# Patient Record
Sex: FEMALE | Race: WHITE | NOT HISPANIC OR LATINO | URBAN - METROPOLITAN AREA
[De-identification: names, ages, dates, MRNs, and addresses within clinical notes are randomized per-mention and may not be internally consistent; named-entity substitution may affect disease eponyms.]

---

## 2018-01-08 ENCOUNTER — EMERGENCY (EMERGENCY)
Facility: HOSPITAL | Age: 83
LOS: 1 days | Discharge: HOME | End: 2018-01-08
Attending: EMERGENCY MEDICINE | Admitting: EMERGENCY MEDICINE
Payer: MEDICARE

## 2018-01-08 VITALS
WEIGHT: 111.99 LBS | RESPIRATION RATE: 18 BRPM | HEART RATE: 90 BPM | DIASTOLIC BLOOD PRESSURE: 90 MMHG | TEMPERATURE: 98 F | HEIGHT: 60 IN | OXYGEN SATURATION: 95 % | SYSTOLIC BLOOD PRESSURE: 184 MMHG

## 2018-01-08 VITALS
DIASTOLIC BLOOD PRESSURE: 95 MMHG | HEART RATE: 85 BPM | OXYGEN SATURATION: 91 % | TEMPERATURE: 98 F | RESPIRATION RATE: 18 BRPM | SYSTOLIC BLOOD PRESSURE: 159 MMHG

## 2018-01-08 DIAGNOSIS — S27.329A CONTUSION OF LUNG, UNSPECIFIED, INITIAL ENCOUNTER: ICD-10-CM

## 2018-01-08 DIAGNOSIS — Z98.89 OTHER SPECIFIED POSTPROCEDURAL STATES: Chronic | ICD-10-CM

## 2018-01-08 DIAGNOSIS — S22.39XA FRACTURE OF ONE RIB, UNSPECIFIED SIDE, INITIAL ENCOUNTER FOR CLOSED FRACTURE: ICD-10-CM

## 2018-01-08 DIAGNOSIS — Z79.891 LONG TERM (CURRENT) USE OF OPIATE ANALGESIC: ICD-10-CM
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
## 2018-01-08 DIAGNOSIS — Z79.899 OTHER LONG TERM (CURRENT) DRUG THERAPY: ICD-10-CM

## 2018-01-08 DIAGNOSIS — M25.561 PAIN IN RIGHT KNEE: ICD-10-CM

## 2018-01-08 DIAGNOSIS — S22.41XA MULTIPLE FRACTURES OF RIBS, RIGHT SIDE, INITIAL ENCOUNTER FOR CLOSED FRACTURE: ICD-10-CM

## 2018-01-08 DIAGNOSIS — I10 ESSENTIAL (PRIMARY) HYPERTENSION: ICD-10-CM
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
## 2018-01-08 LAB
ALBUMIN SERPL ELPH-MCNC: 3.7 G/DL — SIGNIFICANT CHANGE UP (ref 3.3–5)
ALP SERPL-CCNC: 75 U/L — SIGNIFICANT CHANGE UP (ref 40–120)
ALT FLD-CCNC: 13 U/L — SIGNIFICANT CHANGE UP (ref 10–45)
ANION GAP SERPL CALC-SCNC: 13 MMOL/L — SIGNIFICANT CHANGE UP (ref 5–17)
AST SERPL-CCNC: 24 U/L — SIGNIFICANT CHANGE UP (ref 10–40)
BASOPHILS NFR BLD AUTO: 0.2 % — SIGNIFICANT CHANGE UP (ref 0–2)
BILIRUB SERPL-MCNC: 0.8 MG/DL — SIGNIFICANT CHANGE UP (ref 0.2–1.2)
BUN SERPL-MCNC: 18 MG/DL — SIGNIFICANT CHANGE UP (ref 7–23)
CALCIUM SERPL-MCNC: 9 MG/DL — SIGNIFICANT CHANGE UP (ref 8.4–10.5)
CHLORIDE SERPL-SCNC: 94 MMOL/L — LOW (ref 96–108)
CO2 SERPL-SCNC: 26 MMOL/L — SIGNIFICANT CHANGE UP (ref 22–31)
CREAT SERPL-MCNC: 0.52 MG/DL — SIGNIFICANT CHANGE UP (ref 0.5–1.3)
EOSINOPHIL NFR BLD AUTO: 0.1 % — SIGNIFICANT CHANGE UP (ref 0–6)
GLUCOSE SERPL-MCNC: 125 MG/DL — HIGH (ref 70–99)
HCT VFR BLD CALC: 38.3 % — SIGNIFICANT CHANGE UP (ref 34.5–45)
HGB BLD-MCNC: 12.8 G/DL — SIGNIFICANT CHANGE UP (ref 11.5–15.5)
LYMPHOCYTES # BLD AUTO: 9.1 % — LOW (ref 13–44)
MCHC RBC-ENTMCNC: 30.6 PG — SIGNIFICANT CHANGE UP (ref 27–34)
MCHC RBC-ENTMCNC: 33.4 G/DL — SIGNIFICANT CHANGE UP (ref 32–36)
MCV RBC AUTO: 91.6 FL — SIGNIFICANT CHANGE UP (ref 80–100)
MONOCYTES NFR BLD AUTO: 7.5 % — SIGNIFICANT CHANGE UP (ref 2–14)
NEUTROPHILS NFR BLD AUTO: 83.1 % — HIGH (ref 43–77)
PLATELET # BLD AUTO: 362 K/UL — SIGNIFICANT CHANGE UP (ref 150–400)
POTASSIUM SERPL-MCNC: 3.7 MMOL/L — SIGNIFICANT CHANGE UP (ref 3.5–5.3)
POTASSIUM SERPL-SCNC: 3.7 MMOL/L — SIGNIFICANT CHANGE UP (ref 3.5–5.3)
PROT SERPL-MCNC: 7.8 G/DL — SIGNIFICANT CHANGE UP (ref 6–8.3)
RBC # BLD: 4.18 M/UL — SIGNIFICANT CHANGE UP (ref 3.8–5.2)
RBC # FLD: 13.9 % — SIGNIFICANT CHANGE UP (ref 10.3–16.9)
SODIUM SERPL-SCNC: 133 MMOL/L — LOW (ref 135–145)
WBC # BLD: 12.7 K/UL — HIGH (ref 3.8–10.5)
WBC # FLD AUTO: 12.7 K/UL — HIGH (ref 3.8–10.5)

## 2018-01-08 PROCEDURE — 99205 OFFICE O/P NEW HI 60 MIN: CPT

## 2018-01-08 PROCEDURE — 73562 X-RAY EXAM OF KNEE 3: CPT

## 2018-01-08 PROCEDURE — 80053 COMPREHEN METABOLIC PANEL: CPT

## 2018-01-08 PROCEDURE — 99285 EMERGENCY DEPT VISIT HI MDM: CPT

## 2018-01-08 PROCEDURE — 73521 X-RAY EXAM HIPS BI 2 VIEWS: CPT | Mod: 26

## 2018-01-08 PROCEDURE — 36415 COLL VENOUS BLD VENIPUNCTURE: CPT

## 2018-01-08 PROCEDURE — 73521 X-RAY EXAM HIPS BI 2 VIEWS: CPT

## 2018-01-08 PROCEDURE — 71100 X-RAY EXAM RIBS UNI 2 VIEWS: CPT

## 2018-01-08 PROCEDURE — 73562 X-RAY EXAM OF KNEE 3: CPT | Mod: 26,RT

## 2018-01-08 PROCEDURE — 85025 COMPLETE CBC W/AUTO DIFF WBC: CPT

## 2018-01-08 PROCEDURE — 99285 EMERGENCY DEPT VISIT HI MDM: CPT | Mod: 25

## 2018-01-08 PROCEDURE — 71101 X-RAY EXAM UNILAT RIBS/CHEST: CPT | Mod: 26,RT

## 2018-01-08 PROCEDURE — 71045 X-RAY EXAM CHEST 1 VIEW: CPT

## 2018-01-08 RX ORDER — OXYCODONE AND ACETAMINOPHEN 5; 325 MG/1; MG/1
2 TABLET ORAL ONCE
Qty: 0 | Refills: 0 | Status: DISCONTINUED | OUTPATIENT
Start: 2018-01-08 | End: 2018-01-08

## 2018-01-08 RX ADMIN — OXYCODONE AND ACETAMINOPHEN 2 TABLET(S): 5; 325 TABLET ORAL at 14:15

## 2018-01-08 RX ADMIN — OXYCODONE AND ACETAMINOPHEN 2 TABLET(S): 5; 325 TABLET ORAL at 13:31

## 2018-01-08 NOTE — CONSULT NOTE ADULT - PROBLEM SELECTOR RECOMMENDATION 9
Spoke to ED attending, with Dr. Euceda (thoracic fellow/attending).  Recommend trauma evaluation, and admission to the hospital for pain control, IS, chest PT and CT chest, along with CT head.  We would have considered taking the patient onto our service after 24 hours of observation however, since there is no trauma center here, it may be safer to transfer her to a trauma center given the extent of her rib fractures and age.

## 2018-01-08 NOTE — ED ADULT NURSE REASSESSMENT NOTE - NS ED NURSE REASSESS COMMENT FT1
Pt assisted onto bedpan, tolerated well. Pt informed of plan of care, awaiting Xray. Will continue plan of care. Will continue to monitor.

## 2018-01-08 NOTE — ED ADULT NURSE REASSESSMENT NOTE - NS ED NURSE REASSESS COMMENT FT1
surgery team evaluating pt at bedside. Pt denies any pain at present time. Pending CT. Will continue to monitor.

## 2018-01-08 NOTE — ED PROVIDER NOTE - CARE PLAN
Principal Discharge DX:	Closed fracture of multiple ribs, unspecified laterality, initial encounter  Secondary Diagnosis:	Contusion of lung, unspecified laterality, initial encounter

## 2018-01-08 NOTE — CONSULT NOTE ADULT - SUBJECTIVE AND OBJECTIVE BOX
Surgeon: Amado Dela Cruz    Requesting Physician: ED attending    HISTORY OF PRESENT ILLNESS:  This is an 88 y/o female with PMHx HTN, hip surgery, shoulder surgery who had a mechanical fall yesterday while in her home, unsure if she hit her head or not.  No preceding dizziness per patient.  She comes in today due to persistent right side pain in her chest/ribs, right knee and hip pain.  Per Xrays, no hip fracture or knee fracture.  CT chest ordered.  CXR shows 7 right side rib fractures, most displaced.  SHe denies any SOB or chest pain. Denies Fever, chills.      PAST MEDICAL & SURGICAL HISTORY:  HTN (hypertension)  H/O shoulder surgery  History of hip surgery      MEDICATIONS  (STANDING):    MEDICATIONS  (PRN):      Allergies    No Known Allergies    Intolerances        SOCIAL HISTORY:  Denies smoking, ETOH or drug use.  Has a handicapped daughter who she is anxious to get home to.     FAMILY HISTORY:      Review of Systems  CONSTITUTIONAL:  Fevers / chills, sweats, fatigue, weight loss, weight gain                                    NEGATIVE  NEURO:  parathesias, seizures, syncope, confusion                                                                               NEGATIVE  EYES:  Blurry vision, discharge, pain, loss of vision                                                                                  NEGATIVE  ENMT:  Difficulty hearing, vertigo, dysphagia, epistaxis, recent dental work                                     NEGATIVE  CV:  Chest pain, palpitations, MIRELES, orthopnea                                                                                         NEGATIVE  RESPIRATORY:  Wheezing, SOB, cough / sputum, hemoptysis                                                              NEGATIVE  GI:  Nausea, vommiting, diarrhea, constipation, melena                                                                        NEGATIVE  : Hematuria, dysuria, urgency, incontinence                                                                                       NEGATIVE  MUSKULOSKELETAL:  arthritis, joint swelling, muscle weakness                                                           NEGATIVE  SKIN/BREAST:  rash, itching, rosa loss, masses                                                                                            NEGATIVE  PSYCH:  depresion, anxiety, suicidal ideation                                                                                             NEGATIVE  HEME/LYMPH:  bruises easily, enlarged lymph nodes, tender lymph nodes                                        NEGATIVE  ENDOCRINE:  cold intolerance, heat intolerance, polydipsia                                                                   NEGATIVE    +Right rib pain, right hip pain, right knee pain.   PHYSICAL EXAM   Vital Signs Last 24 Hrs  T(C): 37.3 (08 Jan 2018 14:29), Max: 37.3 (08 Jan 2018 14:29)  T(F): 99.2 (08 Jan 2018 14:29), Max: 99.2 (08 Jan 2018 14:29)  HR: 79 (08 Jan 2018 14:29) (79 - 90)  BP: 169/100 (08 Jan 2018 14:29) (169/100 - 184/90)  BP(mean): --  RR: 18 (08 Jan 2018 14:29) (18 - 18)  SpO2: 95% (08 Jan 2018 14:29) (95% - 95%)    CONSTITUTIONAL:                                                                  NAD, looks uncomfortable on any movement of her torso.  But breathing is calm.   NEURO:                                                                                           No focal deficits.   EYES:                                                                                                  WNL  ENMT:                                                                                                WNL  CV:                                                                                                     S1S2 no murmur appreciated.   RESPIRATORY:                                                                                 Diminished right base, otherwise clear.   GI:                                                                                                       soft, non tender to palpation  : HASSAN + / -                                                                                no hassan  MUSKULOSKELETAL:                                                                       WNL  SKIN / BREAST:                                                                                 WNL  Ext Warm and well perfused.  No peripheral edema.                                                           LABS:                        12.8   12.7  )-----------( 362      ( 08 Jan 2018 15:18 )             38.3     01-08    133<L>  |  94<L>  |  18  ----------------------------<  125<H>  3.7   |  26  |  0.52    Ca    9.0      08 Jan 2018 15:18    TPro  7.8  /  Alb  3.7  /  TBili  0.8  /  DBili  x   /  AST  24  /  ALT  13  /  AlkPhos  75  01-08                RADIOLOGY & ADDITIONAL STUDIES:  CXR 7 right side rib fractures.

## 2018-01-08 NOTE — ED PROVIDER NOTE - MEDICAL DECISION MAKING DETAILS
here w/ at least 7 rib fx and pulm contusion from fall yesterday. seen by ct surg, concern too many fx for them to be comfortable to keep her in a non trauma center. will call [facility] to transfer. trauma attending dr goldman accepted for ed to ed transfer and trauma consult. d/w ED attending Dr. Zavala

## 2018-01-08 NOTE — ED PROVIDER NOTE - PHYSICAL EXAMINATION
CONSTITUTIONAL: Well-appearing; well-nourished; in no apparent distress.   HEAD: Normocephalic; atraumatic.   EYES:  conjunctiva and sclera clear  ENT: normal nose; no rhinorrhea; normal pharynx with no erythema or lesions.   NECK: Supple; non-tender; no c spine tenderness.  CARDIOVASCULAR: Normal S1, S2; no murmurs, rubs, or gallops. Regular rate and rhythm. diffuse tenderness over R lateral posterior and anterior chest wall.  RESPIRATORY: Breathing easily; breath sounds clear and equal bilaterally; no wheezes, rhonchi, or rales.  GI: Soft; non-distended; non-tender; no palpable organomegaly. No flank hematoma.   EXT: No cyanosis or edema; N/V intact  SKIN: Normal for age and race; warm; dry; good turgor; no apparent lesions or rash.   NEURO: A & O x 3; face symmetric; grossly unremarkable.   PSYCHOLOGICAL: The patient’s mood and manner are appropriate.

## 2018-01-08 NOTE — ED ADULT NURSE NOTE - OBJECTIVE STATEMENT
88 y/o female BIBEMS s/p mechanical fall yesterday. Pt states " I was going to the bathroom, I tripped and fell on my side". Pt denies LOC, c/o right arm, intercostal and knee pain., no deformity noted, decreased ROM to arm and increased pain on palpation upon assessment.

## 2018-01-08 NOTE — CONSULT NOTE ADULT - ASSESSMENT
86 y/o female wtih 7 right side rib fractures s/p mechanical fall yesterday. No discernable pneumothorax or pleural effusion.

## 2018-01-08 NOTE — ED PROVIDER NOTE - OBJECTIVE STATEMENT
86yo F hx of HTN, multiple orthopedic surgeries, here w/ complaint of trip and fall backwards onto back. Denies LOC or head injury. Denies midline spinal pain or neck pain. +R knee pain. No new hip/pelvis pain, but pt concerned as had bilateral hip surgeries. 88yo F hx of HTN, multiple orthopedic surgeries, here w/ complaint of trip and fall backwards onto back. Denies LOC or head injury. Denies midline spinal pain or neck pain. +R knee pain. No new hip/pelvis pain, but pt concerned as had bilateral hip surgeries. Complaining of rib pain, severe. Fall happened yesterday, pain worsening.

## 2018-01-08 NOTE — ED ADULT TRIAGE NOTE - CHIEF COMPLAINT QUOTE
Patient BIBA. Patient states, "I fell in my bathroom yesterday and I have pain in my back." Patient reports trip and fall. Patient denies any use of blood thinners, denies hitting head, dizziness, CP, SOB.

## 2019-12-02 ENCOUNTER — INPATIENT (INPATIENT)
Facility: HOSPITAL | Age: 84
LOS: 4 days | Discharge: NURSING, REHAB OR LONG-TERM CARE | DRG: 914 | End: 2019-12-07
Attending: INTERNAL MEDICINE | Admitting: INTERNAL MEDICINE
Payer: MEDICARE

## 2019-12-02 VITALS
RESPIRATION RATE: 17 BRPM | DIASTOLIC BLOOD PRESSURE: 105 MMHG | TEMPERATURE: 99 F | SYSTOLIC BLOOD PRESSURE: 164 MMHG | HEART RATE: 93 BPM | OXYGEN SATURATION: 98 %

## 2019-12-02 DIAGNOSIS — R63.8 OTHER SYMPTOMS AND SIGNS CONCERNING FOOD AND FLUID INTAKE: ICD-10-CM

## 2019-12-02 DIAGNOSIS — R53.1 WEAKNESS: ICD-10-CM
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
## 2019-12-02 DIAGNOSIS — R30.0 DYSURIA: ICD-10-CM

## 2019-12-02 DIAGNOSIS — Z98.89 OTHER SPECIFIED POSTPROCEDURAL STATES: Chronic | ICD-10-CM

## 2019-12-02 DIAGNOSIS — D72.829 ELEVATED WHITE BLOOD CELL COUNT, UNSPECIFIED: ICD-10-CM

## 2019-12-02 DIAGNOSIS — I10 ESSENTIAL (PRIMARY) HYPERTENSION: ICD-10-CM

## 2019-12-02 LAB
ANION GAP SERPL CALC-SCNC: 12 MMOL/L — SIGNIFICANT CHANGE UP (ref 5–17)
BASOPHILS # BLD AUTO: 0.08 K/UL — SIGNIFICANT CHANGE UP (ref 0–0.2)
BASOPHILS NFR BLD AUTO: 0.6 % — SIGNIFICANT CHANGE UP (ref 0–2)
BUN SERPL-MCNC: 15 MG/DL — SIGNIFICANT CHANGE UP (ref 7–23)
CALCIUM SERPL-MCNC: 9.3 MG/DL — SIGNIFICANT CHANGE UP (ref 8.4–10.5)
CHLORIDE SERPL-SCNC: 101 MMOL/L — SIGNIFICANT CHANGE UP (ref 96–108)
CO2 SERPL-SCNC: 23 MMOL/L — SIGNIFICANT CHANGE UP (ref 22–31)
CREAT SERPL-MCNC: 0.58 MG/DL — SIGNIFICANT CHANGE UP (ref 0.5–1.3)
EOSINOPHIL # BLD AUTO: 0.08 K/UL — SIGNIFICANT CHANGE UP (ref 0–0.5)
EOSINOPHIL NFR BLD AUTO: 0.6 % — SIGNIFICANT CHANGE UP (ref 0–6)
GLUCOSE SERPL-MCNC: 111 MG/DL — HIGH (ref 70–99)
HCT VFR BLD CALC: 43.1 % — SIGNIFICANT CHANGE UP (ref 34.5–45)
HGB BLD-MCNC: 13.9 G/DL — SIGNIFICANT CHANGE UP (ref 11.5–15.5)
IMM GRANULOCYTES NFR BLD AUTO: 0.7 % — SIGNIFICANT CHANGE UP (ref 0–1.5)
LYMPHOCYTES # BLD AUTO: 1.81 K/UL — SIGNIFICANT CHANGE UP (ref 1–3.3)
LYMPHOCYTES # BLD AUTO: 13.1 % — SIGNIFICANT CHANGE UP (ref 13–44)
MCHC RBC-ENTMCNC: 30.8 PG — SIGNIFICANT CHANGE UP (ref 27–34)
MCHC RBC-ENTMCNC: 32.3 GM/DL — SIGNIFICANT CHANGE UP (ref 32–36)
MCV RBC AUTO: 95.4 FL — SIGNIFICANT CHANGE UP (ref 80–100)
MONOCYTES # BLD AUTO: 1.11 K/UL — HIGH (ref 0–0.9)
MONOCYTES NFR BLD AUTO: 8.1 % — SIGNIFICANT CHANGE UP (ref 2–14)
NEUTROPHILS # BLD AUTO: 10.6 K/UL — HIGH (ref 1.8–7.4)
NEUTROPHILS NFR BLD AUTO: 76.9 % — SIGNIFICANT CHANGE UP (ref 43–77)
NRBC # BLD: 0 /100 WBCS — SIGNIFICANT CHANGE UP (ref 0–0)
PLATELET # BLD AUTO: 401 K/UL — HIGH (ref 150–400)
POTASSIUM SERPL-MCNC: 4.5 MMOL/L — SIGNIFICANT CHANGE UP (ref 3.5–5.3)
POTASSIUM SERPL-SCNC: 4.5 MMOL/L — SIGNIFICANT CHANGE UP (ref 3.5–5.3)
RBC # BLD: 4.52 M/UL — SIGNIFICANT CHANGE UP (ref 3.8–5.2)
RBC # FLD: 14.5 % — SIGNIFICANT CHANGE UP (ref 10.3–14.5)
SODIUM SERPL-SCNC: 136 MMOL/L — SIGNIFICANT CHANGE UP (ref 135–145)
WBC # BLD: 13.77 K/UL — HIGH (ref 3.8–10.5)
WBC # FLD AUTO: 13.77 K/UL — HIGH (ref 3.8–10.5)

## 2019-12-02 PROCEDURE — 99222 1ST HOSP IP/OBS MODERATE 55: CPT | Mod: GC

## 2019-12-02 PROCEDURE — 99285 EMERGENCY DEPT VISIT HI MDM: CPT

## 2019-12-02 PROCEDURE — 73552 X-RAY EXAM OF FEMUR 2/>: CPT | Mod: 26,LT

## 2019-12-02 PROCEDURE — 73502 X-RAY EXAM HIP UNI 2-3 VIEWS: CPT | Mod: 26,LT

## 2019-12-02 RX ORDER — ACETAMINOPHEN 500 MG
650 TABLET ORAL ONCE
Refills: 0 | Status: COMPLETED | OUTPATIENT
Start: 2019-12-02 | End: 2019-12-02

## 2019-12-02 RX ORDER — METOPROLOL TARTRATE 50 MG
25 TABLET ORAL
Refills: 0 | Status: DISCONTINUED | OUTPATIENT
Start: 2019-12-02 | End: 2019-12-03

## 2019-12-02 RX ORDER — ACETAMINOPHEN 500 MG
650 TABLET ORAL EVERY 6 HOURS
Refills: 0 | Status: DISCONTINUED | OUTPATIENT
Start: 2019-12-02 | End: 2019-12-07

## 2019-12-02 RX ADMIN — Medication 650 MILLIGRAM(S): at 17:29

## 2019-12-02 RX ADMIN — Medication 650 MILLIGRAM(S): at 17:28

## 2019-12-02 NOTE — H&P ADULT - NSHPPHYSICALEXAM_GEN_ALL_CORE
PHYSICAL EXAM:  Constitutional: NAD, comfortable in bed.  HEENT: NC/AT, PERRLA, EOMI, no conjunctival pallor or scleral icterus, MMM  Neck: Supple, no JVD  Respiratory: Normal rate, rhythm, depth, effort. CTAB. No w/r/r.   Cardiovascular: RRR, normal S1 and S2, no m/r/g.   Gastrointestinal: +BS, soft NTND, no guarding or rebound tenderness, no palpable masses   Extremities: wwp; no cyanosis, clubbing or edema.   Vascular: Pulses equal and strong throughout.   Neurological: AAOx3, no CN deficits, sensation intact throughout. Strength 5/5 bilaterally in upper extremities, 2/5 bilaterally lower extremities but limited effort on exam.  Skin: No gross skin abnormalities or rashes PHYSICAL EXAM:  Constitutional: Elderly Female, NAD, comfortable in bed.  HEENT: NC/AT, PERRLA, EOMI, no conjunctival pallor or scleral icterus, mildly dry MM  Neck: Supple, no JVD  Respiratory: Normal rate, rhythm, depth, effort. CTAB. No w/r/r.   Cardiovascular: RRR, normal S1 and S2, no m/r/g.   Gastrointestinal: +BS, soft NTND, no guarding or rebound tenderness, no palpable masses   Extremities: wwp; no cyanosis, clubbing or edema.   Vascular: Pulses equal and strong throughout.   Neurological: AAOx3, no CN deficits, sensation intact throughout. Strength 5/5 bilaterally in upper extremities, 2/5 bilaterally lower extremities but limited effort on exam.  Skin: No gross skin abnormalities or rashes

## 2019-12-02 NOTE — ED ADULT TRIAGE NOTE - OTHER COMPLAINTS
pt c.o L hip/leg pain s.p mechanical fall at home. denies head trauma. per ems, found to have low saturation, given 2 L nc, sating 98%. nad.

## 2019-12-02 NOTE — ED PROVIDER NOTE - CLINICAL SUMMARY MEDICAL DECISION MAKING FREE TEXT BOX
reports mechanical fall with left hip pain after.  unable to ambulate/ get up due to weakness.  xray done and no fracture seen, prior hip replacement appears intact.  attempted to ambulate but unable to take any steps even with assistance/ feels very weak. unsafe dc.  will admit for pain control/ pt.  low suspicion occult fracture but may need additional imaging if pain not improving.

## 2019-12-02 NOTE — H&P ADULT - PROBLEM SELECTOR PLAN 2
-likely baseline weakness Likely baseline weakness. Pt non-toxic appearing. Pt without lab abnormalities that could explain weakness  - PT eval

## 2019-12-02 NOTE — H&P ADULT - HISTORY OF PRESENT ILLNESS
This is an 89 y/o F with a history of HTN and b/l hip and shoulder surgery who presents after falling in her home this afternoon. The patient states that she was getting up from her seat to use her walker when she stumbled on her rug and fell on her left side. She was unable to get up on her own and so EMS was called. She states that while resting in bed she does not feel any pain but when she attempts to move she has some pain in her lower back and left hip. She states that she did not lose consciousness at any point prior, during, or after the fall. She denied any lightheadedness, vision changes, sweating, chest pain, or palpitations prior to falling. She states she has fallen a few months ago in a similar situation where she stumbled. She has no prior history of seizures, strokes, or syncopal events. She lives at home with her disabled daughter, and requires help from visiting aides for multiple hours during the day and night. They help her with ADLs such as cooking, shopping, cleaning, and bathing. She endorses a good appetite and good intake today. She did endorse a burning sensation during urination for the past few weeks. She denies any recent fever, chills, headache, diarrhea, constipation, nausea, vomiting, or skin changes. This is an 89 y/o F with a history of HTN and b/l hip and shoulder surgery who presents after falling in her home on the day of admission. The patient states that she was getting up from her seat to use her walker when she stumbled on her rug and fell on her left side. She was unable to get up on her own and so EMS was called. She states that while resting in bed she does not feel any pain but when she attempts to move she has some pain in her lower back and left hip. She states that she did not lose consciousness at any point prior, during, or after the fall. Denied head trauma. She denied any lightheadedness, vision changes, sweating, chest pain, or palpitations prior to falling. She states she has fallen a few months ago in a similar situation where she stumbled. She has no prior history of seizures, strokes, or syncopal events. She lives at home with her disabled daughter, and requires 24-hour help from visiting aides. They help her with ADLs such as cooking, shopping, cleaning, and bathing. She endorses a good appetite and good intake today. She did endorse a burning sensation during urination for the past few weeks. She denies any recent fever, chills, headache, diarrhea, constipation, nausea, vomiting, or skin changes.     On arrival to ED, VS: T 98.6, HR 93, /105, RR 17, SpO2 98% on 2L NC ->     Labs significant for: WBC 13.77    In ED, pt received: acetaminophen 650mg PO x1 This is an 89 y/o F with a history of HTN and b/l hip and shoulder surgery who presents after falling in her home on the day of admission. The patient states that she was getting up from her seat to use her walker when she stumbled on her rug and fell on her left side. She was unable to get up on her own and so EMS was called. She states that while resting in bed she does not feel any pain but when she attempts to move she has some pain in her lower back and left hip. She states that she did not lose consciousness at any point prior, during, or after the fall. Denied head trauma. She denied any lightheadedness, vision changes, sweating, chest pain, or palpitations prior to falling. She states she has fallen a few months ago in a similar situation where she stumbled. She has no prior history of seizures, strokes, or syncopal events. She lives at home with her disabled daughter, and requires 24-hour help from visiting aides. They help her with ADLs such as cooking, shopping, cleaning, and bathing. She endorses a good appetite and good intake today. She did endorse a burning sensation during urination for the past few weeks. She denies any recent fever, chills, headache, diarrhea, constipation, nausea, vomiting, or skin changes.     On arrival to ED, VS: T 98.6, HR 93, /105, RR 17, SpO2 98% on 2L NC    Labs significant for: WBC 13.77    XR left hip and femur negative for acute fracture    In ED, pt received: acetaminophen 650mg PO x1

## 2019-12-02 NOTE — H&P ADULT - NSHPREVIEWOFSYSTEMS_GEN_ALL_CORE
REVIEW OF SYSTEMS      General:	denies weight loss, +fatigue    Skin/Breast: denies rash  	  Ophthalmologic: denies blurry/double vision  	  ENMT: denies sore throat, discharge    Respiratory and Thorax: denies shortness of breath, denies cough  	  Cardiovascular: denies chest pain, palpitations    Gastrointestinal:	denies nausea, vomiting, diarrhea, constipation, abdominal pain    Genitourinary:	+dysuria, denies increased frequency, urgency.     Musculoskeletal:	+ pain in lower back, left hip    Neurological:	denies vision changes, dizziness, tinnitus, weakness, change in sensation REVIEW OF SYSTEMS    General:	denies weight loss, +fatigue    Skin/Breast: denies rash  	  Ophthalmologic: denies blurry/double vision  	  ENMT: denies sore throat, discharge    Respiratory and Thorax: denies shortness of breath, denies cough  	  Cardiovascular: denies chest pain, palpitations    Gastrointestinal: denies nausea, vomiting, diarrhea, constipation, abdominal pain    Genitourinary: +dysuria, denies increased frequency, urgency.     Musculoskeletal: + pain in lower back, left hip    Neurological: denies vision changes, dizziness, tinnitus, weakness, change in sensation

## 2019-12-02 NOTE — ED PROVIDER NOTE - OBJECTIVE STATEMENT
here with pain in left hip/ thigh after trip and fall at home.  Says she tripped over carpet/ falling onto left side.  No one around and she couldn't get up on her own.  Now with pain in hip increased with movement.  Denies other injury.  No headache, dizziness, chest pain, sob, loc, syncope.  Didn't take anything for pain.

## 2019-12-02 NOTE — ED PROVIDER NOTE - DIAGNOSTIC INTERPRETATION
hip xray: no fracture, hardware in place, no dislocation, soft tissues normal read by Dr. Soto  femur xray: no fracture hardware in place, no dislocation, soft tissues normal read by Dr. Soto

## 2019-12-02 NOTE — H&P ADULT - PROBLEM SELECTOR PLAN 3
- continue with home lopressor 25mg BID Pt presenting with WBC 13.77. Most likely etiologies include reactive 2/2 fall vs UTI given dysuria. Pt non-toxic appearing, afebrile.  - f/u UA  - trend CBC

## 2019-12-02 NOTE — ED ADULT NURSE NOTE - CHPI ED NUR SYMPTOMS NEG
no weakness/no vomiting/no confusion/no tingling/no numbness/no fever/no loss of consciousness/no abrasion/no bleeding/no deformity

## 2019-12-02 NOTE — H&P ADULT - ATTENDING COMMENTS
89 year old female patient came in after encountering a mechanical fall is admitted for ambulatory difficulty.   1. Ambulatory Difficulty  PT consult   tylenol PRN for pain

## 2019-12-02 NOTE — ED ADULT NURSE REASSESSMENT NOTE - NS ED NURSE REASSESS COMMENT FT1
attempted to walk patient with 2 RNs. Patient unable to hold up her own weight, will not be able to ambulate independently.

## 2019-12-02 NOTE — H&P ADULT - PROBLEM SELECTOR PLAN 1
-likely mechanical fall given patient reported stumble prior to fall. Other possible causes include neurogenic (denies hx of seizures, TIA, stroke), cardiogenic (denies chest pain, palpitations, previous syncope events), or orthostatic (denies poor PO intake, denies any lightheadedness/vision changes).   -PT evaluation for ambulation status. Likely mechanical fall given patient reported stumble prior to fall. Other possible causes include neurogenic (denies hx of seizures, TIA, stroke), cardiogenic (denies chest pain, palpitations, previous syncope events), or orthostatic (denies poor PO intake, denies any lightheadedness/vision changes).   -PT evaluation for ambulation status  -Tylenol prn for pain control Likely mechanical fall given patient reported stumble prior to fall. Other possible causes include neurogenic (denies hx of seizures, TIA, stroke), cardiogenic (denies chest pain, palpitations, previous syncope events), or orthostatic (denies poor PO intake, denies any lightheadedness/vision changes). HD stable in ED. XR hip and femur negative for fracture.  - PT evaluation for ambulation status  - Tylenol prn for pain control

## 2019-12-02 NOTE — H&P ADULT - NSHPLABSRESULTS_GEN_ALL_CORE
13.9   13.77 )-----------( 401      ( 02 Dec 2019 20:00 )             43.1   12-02    136  |  101  |  15  ----------------------------<  111<H>  4.5   |  23  |  0.58    Ca    9.3      02 Dec 2019 20:00

## 2019-12-02 NOTE — H&P ADULT - PROBLEM SELECTOR PLAN 6
F: Maintenance fluids at ____  E: Replete PRN  N: DASH/TLC    FULL CODE    Dispo: [facility], home vs [facility] pending PT eval

## 2019-12-02 NOTE — H&P ADULT - NSHPSOCIALHISTORY_GEN_ALL_CORE
Lives at home with disabled daughter. Uses walker to ambulate, with difficulty. Requires help of home health aides for ADLs (bathing, cooking, shopping)  Denies tobacco use, denies alcohol use, denies drug use. Lives at home with disabled daughter. Uses walker to ambulate, with difficulty. Requires 24-hr help of home health aides for ADLs (bathing, cooking, shopping)  Denies tobacco use, denies alcohol use, denies drug use.

## 2019-12-02 NOTE — ED PROVIDER NOTE - MUSCULOSKELETAL, MLM
Spine appears normal, no midline tenderness. left hip normal appearance, able to range normally but reports some discomfort with rotation.  no deformity.

## 2019-12-02 NOTE — ED ADULT NURSE REASSESSMENT NOTE - NS ED NURSE REASSESS COMMENT FT1
Patient's power of , Kyle Lee, called requesting updates on patient. Reports patient has a disabled daughter at home who is currently with an aide.   Home phone: [phone]  Cell phone: [phone]

## 2019-12-02 NOTE — H&P ADULT - ASSESSMENT
This is an 89 yo F with a history of HTN who presents with weakness and difficulty walking after suffering a likely mechanical fall given patient reported stumble prior to fall. Other possible causes include neurogenic (denies hx of seizures, TIA, stroke), cardiogenic (denies chest pain, palpitations, previous syncope events), or orthostatic (denies poor PO intake, denies any lightheadedness/vision changes). This is an 88 yo F with a history of HTN who presents with weakness and difficulty walking after suffering a likely mechanical fall.

## 2019-12-02 NOTE — ED PROVIDER NOTE - PROGRESS NOTE DETAILS
attempted to ambulate but unable to take any steps even with assistance.  feels weak.  will admit.  concerned about daughter at home dependant on her attempted to ambulate but unable to take any steps even with assistance.  feels weak.  will admit.  concerned about daughter at home dependant on her.  social work consulted, will come see patient and ensure safety  confirmed that daughter has 24 hour care.  contacted aids who will ensure someone is with her 24hrs.

## 2019-12-03 DIAGNOSIS — Z91.89 OTHER SPECIFIED PERSONAL RISK FACTORS, NOT ELSEWHERE CLASSIFIED: ICD-10-CM

## 2019-12-03 DIAGNOSIS — R53.81 OTHER MALAISE: ICD-10-CM

## 2019-12-03 DIAGNOSIS — N30.00 ACUTE CYSTITIS WITHOUT HEMATURIA: ICD-10-CM

## 2019-12-03 LAB
ANION GAP SERPL CALC-SCNC: 13 MMOL/L — SIGNIFICANT CHANGE UP (ref 5–17)
APPEARANCE UR: CLEAR — SIGNIFICANT CHANGE UP
BACTERIA # UR AUTO: PRESENT /HPF
BASOPHILS # BLD AUTO: 0.03 K/UL — SIGNIFICANT CHANGE UP (ref 0–0.2)
BASOPHILS NFR BLD AUTO: 0.3 % — SIGNIFICANT CHANGE UP (ref 0–2)
BILIRUB UR-MCNC: NEGATIVE — SIGNIFICANT CHANGE UP
BUN SERPL-MCNC: 15 MG/DL — SIGNIFICANT CHANGE UP (ref 7–23)
CALCIUM SERPL-MCNC: 8.9 MG/DL — SIGNIFICANT CHANGE UP (ref 8.4–10.5)
CHLORIDE SERPL-SCNC: 102 MMOL/L — SIGNIFICANT CHANGE UP (ref 96–108)
CO2 SERPL-SCNC: 26 MMOL/L — SIGNIFICANT CHANGE UP (ref 22–31)
COLOR SPEC: YELLOW — SIGNIFICANT CHANGE UP
CREAT SERPL-MCNC: 0.63 MG/DL — SIGNIFICANT CHANGE UP (ref 0.5–1.3)
DIFF PNL FLD: ABNORMAL
EOSINOPHIL # BLD AUTO: 0.13 K/UL — SIGNIFICANT CHANGE UP (ref 0–0.5)
EOSINOPHIL NFR BLD AUTO: 1.3 % — SIGNIFICANT CHANGE UP (ref 0–6)
EPI CELLS # UR: SIGNIFICANT CHANGE UP /HPF (ref 0–5)
GLUCOSE SERPL-MCNC: 94 MG/DL — SIGNIFICANT CHANGE UP (ref 70–99)
GLUCOSE UR QL: NEGATIVE — SIGNIFICANT CHANGE UP
HCT VFR BLD CALC: 41.2 % — SIGNIFICANT CHANGE UP (ref 34.5–45)
HGB BLD-MCNC: 13.3 G/DL — SIGNIFICANT CHANGE UP (ref 11.5–15.5)
IMM GRANULOCYTES NFR BLD AUTO: 0.6 % — SIGNIFICANT CHANGE UP (ref 0–1.5)
KETONES UR-MCNC: NEGATIVE — SIGNIFICANT CHANGE UP
LEUKOCYTE ESTERASE UR-ACNC: ABNORMAL
LYMPHOCYTES # BLD AUTO: 1.92 K/UL — SIGNIFICANT CHANGE UP (ref 1–3.3)
LYMPHOCYTES # BLD AUTO: 19.6 % — SIGNIFICANT CHANGE UP (ref 13–44)
MAGNESIUM SERPL-MCNC: 2 MG/DL — SIGNIFICANT CHANGE UP (ref 1.6–2.6)
MCHC RBC-ENTMCNC: 30.8 PG — SIGNIFICANT CHANGE UP (ref 27–34)
MCHC RBC-ENTMCNC: 32.3 GM/DL — SIGNIFICANT CHANGE UP (ref 32–36)
MCV RBC AUTO: 95.4 FL — SIGNIFICANT CHANGE UP (ref 80–100)
MONOCYTES # BLD AUTO: 0.76 K/UL — SIGNIFICANT CHANGE UP (ref 0–0.9)
MONOCYTES NFR BLD AUTO: 7.7 % — SIGNIFICANT CHANGE UP (ref 2–14)
NEUTROPHILS # BLD AUTO: 6.92 K/UL — SIGNIFICANT CHANGE UP (ref 1.8–7.4)
NEUTROPHILS NFR BLD AUTO: 70.5 % — SIGNIFICANT CHANGE UP (ref 43–77)
NITRITE UR-MCNC: NEGATIVE — SIGNIFICANT CHANGE UP
NRBC # BLD: 0 /100 WBCS — SIGNIFICANT CHANGE UP (ref 0–0)
PH UR: 6 — SIGNIFICANT CHANGE UP (ref 5–8)
PHOSPHATE SERPL-MCNC: 3.6 MG/DL — SIGNIFICANT CHANGE UP (ref 2.5–4.5)
PLATELET # BLD AUTO: 371 K/UL — SIGNIFICANT CHANGE UP (ref 150–400)
POTASSIUM SERPL-MCNC: 3.6 MMOL/L — SIGNIFICANT CHANGE UP (ref 3.5–5.3)
POTASSIUM SERPL-SCNC: 3.6 MMOL/L — SIGNIFICANT CHANGE UP (ref 3.5–5.3)
PROT UR-MCNC: NEGATIVE MG/DL — SIGNIFICANT CHANGE UP
RBC # BLD: 4.32 M/UL — SIGNIFICANT CHANGE UP (ref 3.8–5.2)
RBC # FLD: 14.6 % — HIGH (ref 10.3–14.5)
RBC CASTS # UR COMP ASSIST: < 5 /HPF — SIGNIFICANT CHANGE UP
SODIUM SERPL-SCNC: 141 MMOL/L — SIGNIFICANT CHANGE UP (ref 135–145)
SP GR SPEC: 1.02 — SIGNIFICANT CHANGE UP (ref 1–1.03)
TSH SERPL-MCNC: 1.49 UIU/ML — SIGNIFICANT CHANGE UP (ref 0.35–4.94)
UROBILINOGEN FLD QL: 0.2 E.U./DL — SIGNIFICANT CHANGE UP
WBC # BLD: 9.82 K/UL — SIGNIFICANT CHANGE UP (ref 3.8–10.5)
WBC # FLD AUTO: 9.82 K/UL — SIGNIFICANT CHANGE UP (ref 3.8–10.5)
WBC UR QL: > 10 /HPF

## 2019-12-03 PROCEDURE — 93010 ELECTROCARDIOGRAM REPORT: CPT

## 2019-12-03 PROCEDURE — 99233 SBSQ HOSP IP/OBS HIGH 50: CPT | Mod: GC

## 2019-12-03 RX ORDER — METOPROLOL TARTRATE 50 MG
25 TABLET ORAL
Refills: 0 | Status: DISCONTINUED | OUTPATIENT
Start: 2019-12-03 | End: 2019-12-04

## 2019-12-03 RX ORDER — ENOXAPARIN SODIUM 100 MG/ML
40 INJECTION SUBCUTANEOUS EVERY 24 HOURS
Refills: 0 | Status: DISCONTINUED | OUTPATIENT
Start: 2019-12-03 | End: 2019-12-07

## 2019-12-03 RX ORDER — POTASSIUM CHLORIDE 20 MEQ
40 PACKET (EA) ORAL ONCE
Refills: 0 | Status: COMPLETED | OUTPATIENT
Start: 2019-12-03 | End: 2019-12-03

## 2019-12-03 RX ORDER — SODIUM CHLORIDE 9 MG/ML
1000 INJECTION INTRAMUSCULAR; INTRAVENOUS; SUBCUTANEOUS
Refills: 0 | Status: DISCONTINUED | OUTPATIENT
Start: 2019-12-03 | End: 2019-12-03

## 2019-12-03 RX ORDER — INFLUENZA VIRUS VACCINE 15; 15; 15; 15 UG/.5ML; UG/.5ML; UG/.5ML; UG/.5ML
0.5 SUSPENSION INTRAMUSCULAR ONCE
Refills: 0 | Status: COMPLETED | OUTPATIENT
Start: 2019-12-03 | End: 2019-12-03

## 2019-12-03 RX ORDER — NITROFURANTOIN MACROCRYSTAL 50 MG
100 CAPSULE ORAL
Refills: 0 | Status: COMPLETED | OUTPATIENT
Start: 2019-12-03 | End: 2019-12-07

## 2019-12-03 RX ADMIN — ENOXAPARIN SODIUM 40 MILLIGRAM(S): 100 INJECTION SUBCUTANEOUS at 13:04

## 2019-12-03 RX ADMIN — Medication 25 MILLIGRAM(S): at 06:02

## 2019-12-03 RX ADMIN — Medication 25 MILLIGRAM(S): at 17:44

## 2019-12-03 RX ADMIN — Medication 100 MILLIGRAM(S): at 17:44

## 2019-12-03 RX ADMIN — Medication 100 MILLIGRAM(S): at 13:04

## 2019-12-03 RX ADMIN — Medication 40 MILLIEQUIVALENT(S): at 09:03

## 2019-12-03 NOTE — PHYSICAL THERAPY INITIAL EVALUATION ADULT - GENERAL OBSERVATIONS, REHAB EVAL
Chart reviewed. IE Completed. Patient without complaints of pain at rest, agreeable to PT. Patient received semi-supine, NAD, +(L)hand IV hep lock, ROBIN Aviles cleared patient for treatment.

## 2019-12-03 NOTE — PROGRESS NOTE ADULT - PROBLEM SELECTOR PLAN 5
- continue with home lopressor 25mg BID F: NS at 100cc/hr  E: Replete PRN  N: DASH/TLC  DVT ppx: lovenox    FULL CODE    Dispo: [facility], home vs [facility] pending PT eval

## 2019-12-03 NOTE — PHYSICAL THERAPY INITIAL EVALUATION ADULT - THERAPY FREQUENCY, PT EVAL
2-3x/week/Patient educated on frequency of inpatient therapy at [facility], patient verbalized understanding.

## 2019-12-03 NOTE — PHYSICAL THERAPY INITIAL EVALUATION ADULT - MANUAL MUSCLE TESTING RESULTS, REHAB EVAL
Grossly assessed with functional movement, bilateral UE greater than or equal to 3+/5; B/L LEs unable to bear weight in standing with crouched position

## 2019-12-03 NOTE — PROGRESS NOTE ADULT - PROBLEM SELECTOR PLAN 4
Pt with a few weeks of dysuria. Denies hematuria, vaginal discharge, malodor.  - f/u UA - continue with home lopressor 25mg BID

## 2019-12-03 NOTE — PROGRESS NOTE ADULT - PROBLEM SELECTOR PLAN 6
F: Maintenance fluids at ____  E: Replete PRN  N: DASH/TLC    FULL CODE    Dispo: [facility], home vs [facility] pending PT eval 1) PCP Contacted on Admission: (Y/N) --> Name & Phone #: Dr. Alycia Kaba [phone]  2) Date of Contact with PCP:  3) PCP Contacted at Discharge: (Y/N, N/A)  4) Summary of Handoff Given to PCP:   5) Post-Discharge Appointment Date and Location:

## 2019-12-03 NOTE — PHYSICAL THERAPY INITIAL EVALUATION ADULT - TRANSFER SAFETY CONCERNS NOTED: SIT/STAND, REHAB EVAL
decreased step length/losing balance/inability to maintain weight-bearing restrictions w/o assist/decreased sequencing ability

## 2019-12-03 NOTE — PHYSICAL THERAPY INITIAL EVALUATION ADULT - PHYSICAL ASSIST/NONPHYSICAL ASSIST: SIT/STAND, REHAB EVAL
verbal cues/nonverbal cues (demo/gestures)/2 person assist/assist from CNA; unable to achieve erect posture despite max manual cueing through B/L hips and trunk

## 2019-12-03 NOTE — PHYSICAL THERAPY INITIAL EVALUATION ADULT - PHYSICAL ASSIST/NONPHYSICAL ASSIST: SUPINE/SIT, REHAB EVAL
verbal cues/nonverbal cues (demo/gestures)/1 person assist/able to bring B/L LEs to EOB with VCs; increased assist for trunk mobility

## 2019-12-03 NOTE — PROGRESS NOTE ADULT - PROBLEM SELECTOR PLAN 1
c/b mechanical fall; no e/o acute fracture or dislocation on imaging, hardware in place; cont. PT WBAT

## 2019-12-03 NOTE — PROGRESS NOTE ADULT - PROBLEM SELECTOR PLAN 1
Likely mechanical fall given patient reported stumble prior to fall. Other possible causes include neurogenic (denies hx of seizures, TIA, stroke), cardiogenic (denies chest pain, palpitations, previous syncope events), or orthostatic (denies poor PO intake, denies any lightheadedness/vision changes). HD stable in ED. XR hip and femur negative for fracture.  - PT evaluation for ambulation status  - Tylenol prn for pain control Likely mechanical fall given patient reported stumble prior to fall. Low suspicion for neurogenic (denies hx of seizures, TIA, stroke), cardiogenic (denies chest pain, palpitations, previous syncope events), or orthostatic (denies poor PO intake, denies any lightheadedness/vision changes) causes to fall at this time. HD stable in ED. XR hip and femur negative for fracture.  - Pt has hx of falls, records indicate pt had previously presented to [facility] ED on 1/2018 and found to have 7 R rib fractures 2/2 fall requiring transfer to trauma center.  - PT evaluation for ambulation status  - Tylenol prn for pain control

## 2019-12-03 NOTE — PROGRESS NOTE ADULT - PROBLEM SELECTOR PLAN 2
Likely baseline weakness. Pt non-toxic appearing. Pt without lab abnormalities that could explain weakness  - PT eval Likely baseline weakness and deconditioning, more pronounced in b/l lower extremities. Hx of R femor fixation and L hip arthroplasty. Pt non-toxic appearing. Pt without lab abnormalities that could explain weakness.  - F/u TSH  - PT evaluation

## 2019-12-03 NOTE — PHYSICAL THERAPY INITIAL EVALUATION ADULT - PERTINENT HX OF CURRENT PROBLEM, REHAB EVAL
This is an 88 y/o F with a history of HTN and b/l hip and shoulder surgery who presents after falling in her home on the day of admission. The patient states that she was getting up from her seat to use her walker when she stumbled on her rug and fell on her left side. She was unable to get up on her own and so EMS was called. She states that while resting in bed she does not feel any pain but when she attempts to move she has some pain in her lower back and left hip. Please refer to H&P.

## 2019-12-03 NOTE — PROGRESS NOTE ADULT - ASSESSMENT
88 yo F with a history of HTN who presents with weakness and difficulty walking after suffering a likely mechanical fall. 88 yo F with PMHx of HTN, b/l hip replacements, who presents with weakness and difficulty walking after suffering a likely mechanical fall.

## 2019-12-03 NOTE — PHYSICAL THERAPY INITIAL EVALUATION ADULT - IMPAIRED TRANSFERS: SIT/STAND, REHAB EVAL
impaired balance/decreased strength/impaired postural control/impaired coordination/decreased flexibility

## 2019-12-03 NOTE — PROGRESS NOTE ADULT - PROBLEM SELECTOR PLAN 3
Pt presenting with WBC 13.77. Most likely etiologies include reactive 2/2 fall vs UTI given dysuria. Pt non-toxic appearing, afebrile.  - f/u UA  - trend CBC Pt with reported dysuria and a WBC 13.77 on admission. UA positive for trace LE, >WBC, and bacteria. No CVA tenderness on exam.  - Given symptoms and UA results, in context of recent fall, would treat with PO abx for uncomplicated cystitis  - Macrobid 100mg BID for 5 days (start 12/3)

## 2019-12-03 NOTE — PROGRESS NOTE ADULT - SUBJECTIVE AND OBJECTIVE BOX
Patient is a 89y old  Female who presents with a chief complaint of Mechanical Fall (03 Dec 2019 11:03)      INTERVAL HPI/OVERNIGHT EVENTS:    Pt. seen and examined this afternoon  Pt. c/o generalized debility, recurrent mechanical falls  c/o dysuria  No F/C    Review of Systems: 12 point review of systems otherwise negative    MEDICATIONS  (STANDING):  enoxaparin Injectable 40 milliGRAM(s) SubCutaneous every 24 hours  influenza   Vaccine 0.5 milliLiter(s) IntraMuscular once  metoprolol tartrate 25 milliGRAM(s) Oral two times a day  nitrofurantoin monohydrate/macrocrystals (MACROBID) 100 milliGRAM(s) Oral two times a day with meals  sodium chloride 0.9%. 1000 milliLiter(s) (100 mL/Hr) IV Continuous <Continuous>    MEDICATIONS  (PRN):  acetaminophen   Tablet .. 650 milliGRAM(s) Oral every 6 hours PRN Mild Pain (1 - 3)      Allergies    No Known Allergies    Intolerances          Vital Signs Last 24 Hrs  T(C): 37 (03 Dec 2019 07:44), Max: 37.3 (02 Dec 2019 17:40)  T(F): 98.6 (03 Dec 2019 07:44), Max: 99.1 (02 Dec 2019 17:40)  HR: 79 (03 Dec 2019 07:44) (79 - 108)  BP: 147/85 (03 Dec 2019 07:44) (145/98 - 164/105)  BP(mean): --  RR: 18 (03 Dec 2019 07:44) (17 - 18)  SpO2: 95% (03 Dec 2019 07:44) (95% - 98%)  CAPILLARY BLOOD GLUCOSE            Physical Exam:  (this afternoon)  Daily     Daily   General:  well-appearing in NAD  HEENT:  MMM  Abdomen:  soft NT ND  Skin:  WWP  Neuro:  AAOx3, non-focal    LABS:                        13.3   9.82  )-----------( 371      ( 03 Dec 2019 08:03 )             41.2     12-03    141  |  102  |  15  ----------------------------<  94  3.6   |  26  |  0.63    Ca    8.9      03 Dec 2019 08:03  Phos  3.6     12-03  Mg     2.0     12-03        Urinalysis Basic - ( 03 Dec 2019 02:55 )    Color: Yellow / Appearance: Clear / S.020 / pH: x  Gluc: x / Ketone: NEGATIVE  / Bili: Negative / Urobili: 0.2 E.U./dL   Blood: x / Protein: NEGATIVE mg/dL / Nitrite: NEGATIVE   Leuk Esterase: Trace / RBC: < 5 /HPF / WBC > 10 /HPF   Sq Epi: x / Non Sq Epi: 0-5 /HPF / Bacteria: Present /HPF          RADIOLOGY & ADDITIONAL TESTS:    ---------------------------------------------------------------------------  I personally reviewed: [  ]EKG   [  ]CXR    [  ] CT    [  ]Other  ---------------------------------------------------------------------------  PLEASE CHECK WHEN PRESENT:     [  ]Heart Failure     [  ] Acute     [  ] Acute on Chronic     [  ] Chronic  -------------------------------------------------------------------     [  ]Diastolic [HFpEF]     [  ]Systolic [HFrEF]     [  ]Combined [HFpEF & HFrEF]     [  ]Other:  -------------------------------------------------------------------  [  ]MATT     [  ]ATN     [  ]Reneal Medullary Necrosis     [  ]Renal Cortical Necrosis     [  ]Other Pathological Lesions:    [  ]CKD 1  [  ]CKD 2  [  ]CKD 3  [  ]CKD 4  [  ]CKD 5  [  ]Other  -------------------------------------------------------------------  [  ]Other/Unspecified:    --------------------------------------------------------------------    Abdominal Nutritional Status  [  ]Malnutrition: See Nutrition Note  [  ]Cachexia  [  ]Other:   [  ]Supplement Ordered:  [  ]Morbid Obesity (BMI >=40]

## 2019-12-03 NOTE — PROGRESS NOTE ADULT - SUBJECTIVE AND OBJECTIVE BOX
OVERNIGHT EVENTS: Admitted overnight. Given tylenol for pain and IVF.    SUBJECTIVE / INTERVAL HPI: Patient seen and examined at bedside. Pt feeling better, but still endorses general weakness. Denies f/c, HA, chest pain, SOB, abdominal pain.    MEDICATIONS  (STANDING):  metoprolol tartrate 25 milliGRAM(s) Oral two times a day  sodium chloride 0.9%. 1000 milliLiter(s) (100 mL/Hr) IV Continuous <Continuous>    MEDICATIONS  (PRN):  acetaminophen   Tablet .. 650 milliGRAM(s) Oral every 6 hours PRN Mild Pain (1 - 3)    Allergies  No Known Allergies    Intolerances  None      VITAL SIGNS:  Vital Signs Last 24 Hrs  T(C): 37 (03 Dec 2019 07:44), Max: 37.3 (02 Dec 2019 17:40)  T(F): 98.6 (03 Dec 2019 07:44), Max: 99.1 (02 Dec 2019 17:40)  HR: 79 (03 Dec 2019 07:44) (79 - 108)  BP: 147/85 (03 Dec 2019 07:44) (145/98 - 164/105)  BP(mean): --  RR: 18 (03 Dec 2019 07:44) (17 - 18)  SpO2: 95% (03 Dec 2019 07:44) (95% - 98%)        PHYSICAL EXAM:  General: Elderly woman, WDWN, NAD, Laying comfortably in bed  HEENT: PERRL, NC/AT, anicteric sclera, MMM  Neck: supple, no cervical tenderness  Cardiovascular: +S1/S2, RRR, No murmurs, rubs, gallops  Respiratory: CTA B/L, no W/R/R  Gastrointestinal: soft, NT/ND, +BSx4, no CVA tenderness  Extremities: WWP, no edema, clubbing or cyanosis, no b/l hip or knee tenderness, no abrasions or skin breaks  Vascular: 2+ radial, DP/PT pulses B/L  Neurological: AAOx3, no focal deficits      LABS:                        13.3   9.82  )-----------( 371      ( 03 Dec 2019 08:03 )             41.2     12-03    141  |  102  |  15  ----------------------------<  94  3.6   |  26  |  0.63    Ca    8.9      03 Dec 2019 08:03  Phos  3.6     12-  Mg     2.0     12-        Urinalysis Basic - ( 03 Dec 2019 02:55 )  Color: Yellow / Appearance: Clear / S.020 / pH: x  Gluc: x / Ketone: NEGATIVE  / Bili: Negative / Urobili: 0.2 E.U./dL   Blood: x / Protein: NEGATIVE mg/dL / Nitrite: NEGATIVE   Leuk Esterase: Trace / RBC: < 5 /HPF / WBC > 10 /HPF   Sq Epi: x / Non Sq Epi: 0-5 /HPF / Bacteria: Present /HPF        RADIOLOGY & ADDITIONAL TESTS: Reviewed.    < from: Xray Femur 2 Views, Left (19 @ 17:46) >  Findings/  impression: No acute fracture or dislocation. Stable positioning left   total arthroplasty with no loosening. Distal femoral infarct/enchondroma.   Soft tissue vascular calcification.  < end of copied text >    < from: Xray Hip w/ Pelvis 2 or 3 Views, Left (19 @ 17:46) >  Findings/  impression: No acute fracture or dislocation. Stable right femoral   fixation hardware.. Stable positioning left hip arthroplasty with no   loosening. 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 Lower lumbar spine degenerative changes.  < end of copied text >

## 2019-12-04 LAB
ANION GAP SERPL CALC-SCNC: 11 MMOL/L — SIGNIFICANT CHANGE UP (ref 5–17)
BUN SERPL-MCNC: 12 MG/DL — SIGNIFICANT CHANGE UP (ref 7–23)
CALCIUM SERPL-MCNC: 9 MG/DL — SIGNIFICANT CHANGE UP (ref 8.4–10.5)
CHLORIDE SERPL-SCNC: 101 MMOL/L — SIGNIFICANT CHANGE UP (ref 96–108)
CO2 SERPL-SCNC: 23 MMOL/L — SIGNIFICANT CHANGE UP (ref 22–31)
CREAT SERPL-MCNC: 0.54 MG/DL — SIGNIFICANT CHANGE UP (ref 0.5–1.3)
CULTURE RESULTS: SIGNIFICANT CHANGE UP
GLUCOSE SERPL-MCNC: 120 MG/DL — HIGH (ref 70–99)
HCT VFR BLD CALC: 44.1 % — SIGNIFICANT CHANGE UP (ref 34.5–45)
HGB BLD-MCNC: 13.8 G/DL — SIGNIFICANT CHANGE UP (ref 11.5–15.5)
MAGNESIUM SERPL-MCNC: 2 MG/DL — SIGNIFICANT CHANGE UP (ref 1.6–2.6)
MCHC RBC-ENTMCNC: 29.8 PG — SIGNIFICANT CHANGE UP (ref 27–34)
MCHC RBC-ENTMCNC: 31.3 GM/DL — LOW (ref 32–36)
MCV RBC AUTO: 95.2 FL — SIGNIFICANT CHANGE UP (ref 80–100)
NRBC # BLD: 0 /100 WBCS — SIGNIFICANT CHANGE UP (ref 0–0)
PLATELET # BLD AUTO: 361 K/UL — SIGNIFICANT CHANGE UP (ref 150–400)
POTASSIUM SERPL-MCNC: 3.9 MMOL/L — SIGNIFICANT CHANGE UP (ref 3.5–5.3)
POTASSIUM SERPL-SCNC: 3.9 MMOL/L — SIGNIFICANT CHANGE UP (ref 3.5–5.3)
RBC # BLD: 4.63 M/UL — SIGNIFICANT CHANGE UP (ref 3.8–5.2)
RBC # FLD: 14.3 % — SIGNIFICANT CHANGE UP (ref 10.3–14.5)
SODIUM SERPL-SCNC: 135 MMOL/L — SIGNIFICANT CHANGE UP (ref 135–145)
SPECIMEN SOURCE: SIGNIFICANT CHANGE UP
WBC # BLD: 9.76 K/UL — SIGNIFICANT CHANGE UP (ref 3.8–10.5)
WBC # FLD AUTO: 9.76 K/UL — SIGNIFICANT CHANGE UP (ref 3.8–10.5)

## 2019-12-04 PROCEDURE — 99233 SBSQ HOSP IP/OBS HIGH 50: CPT | Mod: GC

## 2019-12-04 RX ORDER — METOPROLOL TARTRATE 50 MG
50 TABLET ORAL
Refills: 0 | Status: DISCONTINUED | OUTPATIENT
Start: 2019-12-04 | End: 2019-12-05

## 2019-12-04 RX ADMIN — Medication 50 MILLIGRAM(S): at 19:00

## 2019-12-04 RX ADMIN — Medication 100 MILLIGRAM(S): at 09:54

## 2019-12-04 RX ADMIN — Medication 25 MILLIGRAM(S): at 06:15

## 2019-12-04 RX ADMIN — Medication 100 MILLIGRAM(S): at 18:58

## 2019-12-04 RX ADMIN — ENOXAPARIN SODIUM 40 MILLIGRAM(S): 100 INJECTION SUBCUTANEOUS at 11:11

## 2019-12-04 NOTE — PROGRESS NOTE ADULT - ASSESSMENT
89 yo F with PMHx of HTN, b/l hip replacements, who presents with weakness and difficulty walking after likely mechanical fall, now being treated for UTI.

## 2019-12-04 NOTE — PROGRESS NOTE ADULT - SUBJECTIVE AND OBJECTIVE BOX
OVERNIGHT EVENTS: O/n hr 108, bp 167/110, rechecked afte 2 hours was 142/92 with hr 104. Pt has been baseline tachy. EKG with rbbb.     Interval history: Call PMD, was given metoprolol for elevated bp's and tachy, is supposed to be on 50 twice a day. Prior hip surgery was for fall years ago. Has been prescribed other meds (eg for frequenct urination) but pt reports cant afford. Nephew in [place] is proxy  [phone] (might be Cell phone for Dr. Gemma Kaba)    SUBJECTIVE: Pt seen and examined at bedside. Feels okay without complaints, vague historian. Unable to report past falls, how long has been using walker, how often she falls. Reports only taking metoprolol at home.    Denies fevers chills, nausea, vomiting. Denies dysuria, frequency. No diarrhea. No abdominal pain. no headaches or changes in vision.    Vital Signs Last 12 Hrs  T(F): 97.6 (19 @ 14:15), Max: 98.3 (19 @ 05:32)  HR: 102 (19 @ 14:15) (102 - 114)  BP: 148/107 (19 @ 14:15) (142/100 - 148/107)  BP(mean): --  RR: 18 (19 @ 14:15) (18 - 19)  SpO2: 95% (19 @ 14:15) (93% - 95%)  I&O's Summary      PHYSICAL EXAM:  General: Elderly woman, WDWN, NAD, Laying comfortably in bed  HEENT: NC/AT, anicteric sclera, MMM  Neck: trachea midline  Cardiovascular: +S1/S2, tachy, reg rhythm, No murmurs, rubs, gallops  Respiratory: CTA B/L, no W/R/R although exam limited by effort  Gastrointestinal: soft, NT/ND, no suprapubic fullness or tenderness  Extremities: no edema, clubbing or cyanosis, no b/l hip or knee tenderness. 5/5 strength with knee flexion extension and ankle flexion/extension. Hip flexion 4/5 but limited by effort.  strength 5/5 in ue b/l. ROM grossly intact. Pulses present throughotu  Neurological: AAOx3        LABS:                        13.8   9.76  )-----------( 361      ( 04 Dec 2019 08:13 )             44.1     12-    135  |  101  |  12  ----------------------------<  120<H>  3.9   |  23  |  0.54    Ca    9.0      04 Dec 2019 08:13  Phos  3.6     1203  Mg     2.0             Urinalysis Basic - ( 03 Dec 2019 02:55 )    Color: Yellow / Appearance: Clear / S.020 / pH: x  Gluc: x / Ketone: NEGATIVE  / Bili: Negative / Urobili: 0.2 E.U./dL   Blood: x / Protein: NEGATIVE mg/dL / Nitrite: NEGATIVE   Leuk Esterase: Trace / RBC: < 5 /HPF / WBC > 10 /HPF   Sq Epi: x / Non Sq Epi: 0-5 /HPF / Bacteria: Present /HPF        RADIOLOGY & ADDITIONAL TESTS:    MEDICATIONS  (STANDING):  enoxaparin Injectable 40 milliGRAM(s) SubCutaneous every 24 hours  influenza   Vaccine 0.5 milliLiter(s) IntraMuscular once  metoprolol tartrate 25 milliGRAM(s) Oral two times a day  nitrofurantoin monohydrate/macrocrystals (MACROBID) 100 milliGRAM(s) Oral two times a day with meals    MEDICATIONS  (PRN):  acetaminophen   Tablet .. 650 milliGRAM(s) Oral every 6 hours PRN Mild Pain (1 - 3)

## 2019-12-04 NOTE — PROGRESS NOTE ADULT - PROBLEM SELECTOR PLAN 5
F: none  E: Replete PRN  N: DASH/TLC  DVT ppx: lovenox    FULL CODE    Dispo: [facility], home vs [facility] pending PT eval

## 2019-12-04 NOTE — PROGRESS NOTE ADULT - PROBLEM SELECTOR PLAN 2
Likely baseline weakness and deconditioning, more pronounced in b/l lower extremities. Hx of R femor fixation and L hip arthroplasty. Pt non-toxic appearing. Pt without lab abnormalities that could explain weakness. TSH normal. Good strength on exam.  - PT evaluation

## 2019-12-04 NOTE — CONSULT NOTE ADULT - ASSESSMENT
per Internal Medicine    89 yo F with PMHx of HTN, b/l hip replacements, who presents with weakness and difficulty walking after suffering a likely mechanical fall.    Problem/Plan - 1:  ·  Problem: Fall.  Plan: Likely mechanical fall given patient reported stumble prior to fall. Low suspicion for neurogenic (denies hx of seizures, TIA, stroke), cardiogenic (denies chest pain, palpitations, previous syncope events), or orthostatic (denies poor PO intake, denies any lightheadedness/vision changes) causes to fall at this time. HD stable in ED. XR hip and femur negative for fracture.  - Pt has hx of falls, records indicate pt had previously presented to [facility] ED on 1/2018 and found to have 7 R rib fractures 2/2 fall requiring transfer to trauma center.  - Tylenol prn for pain control.    Problem/Plan - 2:  ·  Problem: Generalized weakness.  Plan: Likely baseline weakness and deconditioning, more pronounced in b/l lower extremities. Hx of R femor fixation and L hip arthroplasty. Pt non-toxic appearing. Pt without lab abnormalities that could explain weakness.  - F/u TSH  - PT evaluation.    Problem/Plan - 3:  ·  Problem: Acute cystitis without hematuria. Plan: Pt with reported dysuria and a WBC 13.77 on admission. UA positive for trace LE, >WBC, and bacteria. No CVA tenderness on exam.  - Given symptoms and UA results, in context of recent fall, would treat with PO abx for uncomplicated cystitis  - Macrobid 100mg BID for 5 days (start 12/3).    Problem/Plan - 4:  ·  Problem: HTN (hypertension). Plan: - continue with home lopressor 25mg BID.    Problem/Plan - 5:  ·  Problem: Nutrition, metabolism, and development symptoms. Plan: F: NS at 100cc/hr  E: Replete PRN  N: DASH/TLC  DVT ppx: lovenox    FULL CODE

## 2019-12-04 NOTE — CONSULT NOTE ADULT - SUBJECTIVE AND OBJECTIVE BOX
Patient is a 89y old  Female who presents with a chief complaint of Mechanical Fall (03 Dec 2019 15:28)       HPI:  This is an 89 y/o F with a history of HTN and b/l hip and shoulder surgery who presents after falling in her home on the day of admission. The patient states that she was getting up from her seat to use her walker when she stumbled on her rug and fell on her left side. She was unable to get up on her own and so EMS was called. She states that while resting in bed she does not feel any pain but when she attempts to move she has some pain in her lower back and left hip. She states that she did not lose consciousness at any point prior, during, or after the fall. Denied head trauma. She denied any lightheadedness, vision changes, sweating, chest pain, or palpitations prior to falling. She states she has fallen a few months ago in a similar situation where she stumbled. She has no prior history of seizures, strokes, or syncopal events. She lives at home with her disabled daughter, and requires 24-hour help from visiting aides. They help her with ADLs such as cooking, shopping, cleaning, and bathing. She endorses a good appetite and good intake today. She did endorse a burning sensation during urination for the past few weeks. She denies any recent fever, chills, headache, diarrhea, constipation, nausea, vomiting, or skin changes.     On arrival to ED, VS: T 98.6, HR 93, /105, RR 17, SpO2 98% on 2L NC    Labs significant for: WBC 13.77    XR left hip and femur negative for acute fracture    In ED, pt received: acetaminophen 650mg PO x1 (02 Dec 2019 20:20)      PAST MEDICAL & SURGICAL HISTORY:  HTN (hypertension)  H/O shoulder surgery  History of hip surgery      MEDICATIONS  (STANDING):  enoxaparin Injectable 40 milliGRAM(s) SubCutaneous every 24 hours  influenza   Vaccine 0.5 milliLiter(s) IntraMuscular once  metoprolol tartrate 25 milliGRAM(s) Oral two times a day  nitrofurantoin monohydrate/macrocrystals (MACROBID) 100 milliGRAM(s) Oral two times a day with meals    MEDICATIONS  (PRN):  acetaminophen   Tablet .. 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           -  Home Living Status: lives with her "disabled daughter" ( has 24 hr x 7 day home care services)      Functional Level Prior to Admission:           - ADL's:  states she is independent           - ambulates with a rolling walker    FAMILY HISTORY:      CBC Full  -  ( 04 Dec 2019 08:13 )  WBC Count : 9.76 K/uL  RBC Count : 4.63 M/uL  Hemoglobin : 13.8 g/dL  Hematocrit : 44.1 %  Platelet Count - Automated : 361 K/uL  Mean Cell Volume : 95.2 fl  Mean Cell Hemoglobin : 29.8 pg  Mean Cell Hemoglobin Concentration : 31.3 gm/dL  Auto Neutrophil # : x  Auto Lymphocyte # : x  Auto Monocyte # : x  Auto Eosinophil # : x  Auto Basophil # : x  Auto Neutrophil % : x  Auto Lymphocyte % : x  Auto Monocyte % : x  Auto Eosinophil % : x  Auto Basophil % : x      12-    135  |  101  |  12  ----------------------------<  120<H>  3.9   |  23  |  0.54    Ca    9.0      04 Dec 2019 08:13  Phos  3.6     12-  Mg     2.0     12-        Urinalysis Basic - ( 03 Dec 2019 02:55 )    Color: Yellow / Appearance: Clear / S.020 / pH: x  Gluc: x / Ketone: NEGATIVE  / Bili: Negative / Urobili: 0.2 E.U./dL   Blood: x / Protein: NEGATIVE mg/dL / Nitrite: NEGATIVE   Leuk Esterase: Trace / RBC: < 5 /HPF / WBC > 10 /HPF   Sq Epi: x / Non Sq Epi: 0-5 /HPF / Bacteria: Present /HPF          Radiology:    < from: Xray Hip w/ Pelvis 2 or 3 Views, Left (19 @ 17:46) >  EXAM:  XR HIP WITH PELV 2-3V LT                          PROCEDURE DATE:  2019          INTERPRETATION:  Clinical history/reason for exam: Trauma.    3 views.    Comparison: 2018.    Findings/  impression: No acute fracture or dislocation. Stable right femoral   fixation hardware.. Stable positioning left hip arthroplasty with no   loosening. 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 Lower lumbar spine degenerative changes.        < from: Xray Femur 2 Views, Left (19 @ 17:46) >  EXAM:  XR FEMUR 2 VIEWS LT                          PROCEDURE DATE:  2019          INTERPRETATION:  Clinical history/reason for exam: Trauma.    4 views.    Findings/  impression: No acute fracture or dislocation. Stable positioning left   total arthroplasty with no loosening. Distal femoral infarct/enchondroma.   Soft tissue vascular calcification.            Vital Signs Last 24 Hrs  T(C): 36.8 (04 Dec 2019 05:32), Max: 37.7 (03 Dec 2019 18:59)  T(F): 98.3 (04 Dec 2019 05:32), Max: 99.8 (03 Dec 2019 18:59)  HR: 114 (04 Dec 2019 05:32) (104 - 114)  BP: 142/100 (04 Dec 2019 10:25) (142/92 - 157/110)  BP(mean): --  RR: 19 (04 Dec 2019 05:32) (18 - 20)  SpO2: 93% (04 Dec 2019 05:32) (93% - 96%)    REVIEW OF SYSTEMS:    CONSTITUTIONAL:  fatigue  EYES: No eye pain, visual disturbances, or discharge  ENMT:  No difficulty hearing, tinnitus, vertigo; No sinus or throat pain  NECK: No pain or stiffness  BREASTS: No pain, masses, or nipple discharge  RESPIRATORY: No cough, wheezing, chills or hemoptysis; No shortness of breath  CARDIOVASCULAR: No chest pain, palpitations, dizziness, or leg swelling  GASTROINTESTINAL: No abdominal or epigastric pain. No nausea, vomiting, or hematemesis; No diarrhea or constipation. No melena or hematochezia.  GENITOURINARY: No dysuria, frequency, hematuria, or incontinence  NEUROLOGICAL: No headaches, memory loss, loss of strength, numbness, or tremors  SKIN: No itching, burning, rashes, or lesions   LYMPH NODES: No enlarged glands  ENDOCRINE: No heat or cold intolerance; No hair loss  MUSCULOSKELETAL: No joint pain or swelling; No muscle, back, or extremity pain  PSYCHIATRIC: No depression, anxiety, mood swings, or difficulty sleeping  HEME/LYMPH: No easy bruising, or bleeding gums  ALLERGY AND IMMUNOLOGIC: No hives or eczema  VASCULAR: no swelling, erythema   : no dysuria, retention, incontinence      Physical Exam: 88 yo  woman lying in semi Guaman's position , c/o fatigue, pain improving    Head: normocephalic, atraumatic    Eyes: PERRLA, EOMI, no nystagmus, sclera anicteric    ENT: nasal discharge, uvula midline, no oropharyngeal erythema/exudate    Neck: supple, negative JVD, negative carotid bruits, no thyromegaly    Chest: CTA bilaterally, neg wheeze/ rhonchi/ rales/ crackles/ egophany    Cardiovascular: regular rate and rhythm, neg murmurs/rubs/gallops    Abdomen: soft, non distended, non tender, negative rebound/guarding, normal bowel sounds, neg hepatosplenomegaly    Extremities: WWP, neg cyanosis/clubbing/edema, negative calf tenderness to palpation, negative Eder's sign    :     Neurologic Exam:    Alert and oriented to person, place, date/year, speech fluent w/o dysarthria, recent and remote memory intact, repetition intact, comprehension intact    Cranial Nerves:     II:                       pupils equal, round and reactive to light, visual fields intact   III/ IV/VI:            extraocular movements intact, neg nystagmus, neg ptosis  V:                       facial sensation intact, V1-3 normal  VII:                     face symmetric, no droop, normal eye closure and smile  VIII:                    hearing intact to finger rub bilaterally  IX/ X:                 soft palate rise symmetrical  XI:                      head turning, shoulder shrug normal  XII:                     tongue midline    Motor Exam:    Upper Extremities:     RIght:    no focal weakness               negative drift    Left :      no focal weakness               negative drift    Lower Extremities:                 Right:      no focal weakness                 Left:        no focal weakness                   Sensory:    intact to LT/PP in all UE/LE dermatomes    DTR:            = biceps/     triceps/     brachioradialis                      = patella/   medial hamstring/ankle                      neg clonus                      neg Babinski                            Gait:  not tested        PM&R Impression:    1) s/p mechanical fall  2) deconditioned  3) no focal weakness        Recommendations:    1) Physical therapy focusing on therapeutic exercises, bed mobility/transfer out of bed evaluation, progressive ambulation with assistive devices prn.    2) Disposition Plan/Recs: subacute rehab placement, patient is requesting d/c home with home physical therapy

## 2019-12-04 NOTE — PROGRESS NOTE ADULT - PROBLEM SELECTOR PLAN 6
1) PCP Contacted on Admission: (Y/N) --> Name & Phone #: Dr. Alycia Kaba [phone]  2) Date of Contact with PCP:  3) PCP Contacted at Discharge: (Y/N, N/A)  4) Summary of Handoff Given to PCP:   5) Post-Discharge Appointment Date and Location:

## 2019-12-04 NOTE — PROGRESS NOTE ADULT - PROBLEM SELECTOR PLAN 1
Likely mechanical fall given patient reported stumble prior to fall. Low suspicion for neurogenic (denies hx of seizures, TIA, stroke), cardiogenic (denies chest pain, palpitations, previous syncope events), or orthostatic (denies poor PO intake, denies any lightheadedness/vision changes) causes to fall at this time. HD stable in ED. XR hip and femur negative for fracture. No pain.  - Pt has hx of falls, records indicate pt had previously presented to [facility] ED on 1/2018 and found to have 7 R rib fractures 2/2 fall requiring transfer to trauma center.  - PT evaluation for ambulation status  - Tylenol prn for pain control  -f/u b12, folate, rpr

## 2019-12-04 NOTE — PROGRESS NOTE ADULT - PROBLEM SELECTOR PLAN 3
Pt with reported dysuria and a WBC 13.77 on admission. UA positive for trace LE, >WBC, and bacteria. No CVA tenderness on exam.  - Given symptoms and UA results, in context of recent fall, would treat with PO abx for uncomplicated cystitis  - Macrobid 100mg BID for 5 days (started 12/3)

## 2019-12-05 LAB
ALBUMIN SERPL ELPH-MCNC: 3.3 G/DL — SIGNIFICANT CHANGE UP (ref 3.3–5)
ALP SERPL-CCNC: 88 U/L — SIGNIFICANT CHANGE UP (ref 40–120)
ALT FLD-CCNC: 13 U/L — SIGNIFICANT CHANGE UP (ref 10–45)
ANION GAP SERPL CALC-SCNC: 10 MMOL/L — SIGNIFICANT CHANGE UP (ref 5–17)
AST SERPL-CCNC: 26 U/L — SIGNIFICANT CHANGE UP (ref 10–40)
BILIRUB DIRECT SERPL-MCNC: <0.2 MG/DL — SIGNIFICANT CHANGE UP (ref 0–0.2)
BILIRUB INDIRECT FLD-MCNC: >0.4 MG/DL — SIGNIFICANT CHANGE UP (ref 0.2–1)
BILIRUB SERPL-MCNC: 0.6 MG/DL — SIGNIFICANT CHANGE UP (ref 0.2–1.2)
BUN SERPL-MCNC: 16 MG/DL — SIGNIFICANT CHANGE UP (ref 7–23)
CALCIUM SERPL-MCNC: 9 MG/DL — SIGNIFICANT CHANGE UP (ref 8.4–10.5)
CHLORIDE SERPL-SCNC: 100 MMOL/L — SIGNIFICANT CHANGE UP (ref 96–108)
CO2 SERPL-SCNC: 25 MMOL/L — SIGNIFICANT CHANGE UP (ref 22–31)
CREAT SERPL-MCNC: 0.6 MG/DL — SIGNIFICANT CHANGE UP (ref 0.5–1.3)
GLUCOSE SERPL-MCNC: 127 MG/DL — HIGH (ref 70–99)
HCT VFR BLD CALC: 43.3 % — SIGNIFICANT CHANGE UP (ref 34.5–45)
HGB BLD-MCNC: 14.2 G/DL — SIGNIFICANT CHANGE UP (ref 11.5–15.5)
MCHC RBC-ENTMCNC: 30.7 PG — SIGNIFICANT CHANGE UP (ref 27–34)
MCHC RBC-ENTMCNC: 32.8 GM/DL — SIGNIFICANT CHANGE UP (ref 32–36)
MCV RBC AUTO: 93.5 FL — SIGNIFICANT CHANGE UP (ref 80–100)
NRBC # BLD: 0 /100 WBCS — SIGNIFICANT CHANGE UP (ref 0–0)
PLATELET # BLD AUTO: 372 K/UL — SIGNIFICANT CHANGE UP (ref 150–400)
POTASSIUM SERPL-MCNC: 3.9 MMOL/L — SIGNIFICANT CHANGE UP (ref 3.5–5.3)
POTASSIUM SERPL-SCNC: 3.9 MMOL/L — SIGNIFICANT CHANGE UP (ref 3.5–5.3)
PROT SERPL-MCNC: 7.4 G/DL — SIGNIFICANT CHANGE UP (ref 6–8.3)
RBC # BLD: 4.63 M/UL — SIGNIFICANT CHANGE UP (ref 3.8–5.2)
RBC # FLD: 14.4 % — SIGNIFICANT CHANGE UP (ref 10.3–14.5)
SODIUM SERPL-SCNC: 135 MMOL/L — SIGNIFICANT CHANGE UP (ref 135–145)
WBC # BLD: 13.49 K/UL — HIGH (ref 3.8–10.5)
WBC # FLD AUTO: 13.49 K/UL — HIGH (ref 3.8–10.5)

## 2019-12-05 PROCEDURE — 99233 SBSQ HOSP IP/OBS HIGH 50: CPT | Mod: GC

## 2019-12-05 RX ORDER — METOPROLOL TARTRATE 50 MG
75 TABLET ORAL EVERY 12 HOURS
Refills: 0 | Status: DISCONTINUED | OUTPATIENT
Start: 2019-12-05 | End: 2019-12-07

## 2019-12-05 RX ADMIN — Medication 50 MILLIGRAM(S): at 05:50

## 2019-12-05 RX ADMIN — Medication 100 MILLIGRAM(S): at 10:05

## 2019-12-05 RX ADMIN — ENOXAPARIN SODIUM 40 MILLIGRAM(S): 100 INJECTION SUBCUTANEOUS at 10:38

## 2019-12-05 RX ADMIN — Medication 100 MILLIGRAM(S): at 18:39

## 2019-12-05 RX ADMIN — Medication 75 MILLIGRAM(S): at 18:39

## 2019-12-05 NOTE — PROGRESS NOTE ADULT - PROBLEM SELECTOR PLAN 4
Per pcp pt was started on lopressor bid for htn and tachy; per pmd pt reports not being able to afford certain medications so was settled on home lopressor bid. Has been tachy and hypertensive here throughout and asymptomatic.  - increase to lopressor 75mg BID; home dose is reportedly 50

## 2019-12-05 NOTE — PROGRESS NOTE ADULT - SUBJECTIVE AND OBJECTIVE BOX
Physical Medicine and Rehabilitation Progress Note:    Patient is a 89y old  Female who presents with a chief complaint of Mechanical Fall (05 Dec 2019 16:15)      HPI:  This is an 88 y/o F with a history of HTN and b/l hip and shoulder surgery who presents after falling in her home on the day of admission. The patient states that she was getting up from her seat to use her walker when she stumbled on her rug and fell on her left side. She was unable to get up on her own and so EMS was called. She states that while resting in bed she does not feel any pain but when she attempts to move she has some pain in her lower back and left hip. She states that she did not lose consciousness at any point prior, during, or after the fall. Denied head trauma. She denied any lightheadedness, vision changes, sweating, chest pain, or palpitations prior to falling. She states she has fallen a few months ago in a similar situation where she stumbled. She has no prior history of seizures, strokes, or syncopal events. She lives at home with her disabled daughter, and requires 24-hour help from visiting aides. They help her with ADLs such as cooking, shopping, cleaning, and bathing. She endorses a good appetite and good intake today. She did endorse a burning sensation during urination for the past few weeks. She denies any recent fever, chills, headache, diarrhea, constipation, nausea, vomiting, or skin changes.     On arrival to ED, VS: T 98.6, HR 93, /105, RR 17, SpO2 98% on 2L NC    Labs significant for: WBC 13.77    XR left hip and femur negative for acute fracture    In ED, pt received: acetaminophen 650mg PO x1 (02 Dec 2019 20:20)                            14.2   13.49 )-----------( 372      ( 05 Dec 2019 07:39 )             43.3       12-05    135  |  100  |  16  ----------------------------<  127<H>  3.9   |  25  |  0.60    Ca    9.0      05 Dec 2019 07:39  Mg     2.0     12-04    TPro  7.4  /  Alb  3.3  /  TBili  0.6  /  DBili  <0.2  /  AST  26  /  ALT  13  /  AlkPhos  88  12-05    Vital Signs Last 24 Hrs  T(C): 36.4 (05 Dec 2019 13:19), Max: 36.6 (04 Dec 2019 22:16)  T(F): 97.5 (05 Dec 2019 13:19), Max: 97.8 (04 Dec 2019 22:16)  HR: 105 (05 Dec 2019 13:19) (104 - 121)  BP: 124/85 (05 Dec 2019 13:19) (124/85 - 167/121)  BP(mean): --  RR: 18 (05 Dec 2019 13:19) (18 - 20)  SpO2: 93% (05 Dec 2019 13:19) (93% - 100%)    MEDICATIONS  (STANDING):  enoxaparin Injectable 40 milliGRAM(s) SubCutaneous every 24 hours  influenza   Vaccine 0.5 milliLiter(s) IntraMuscular once  metoprolol tartrate 75 milliGRAM(s) Oral every 12 hours  nitrofurantoin monohydrate/macrocrystals (MACROBID) 100 milliGRAM(s) Oral two times a day with meals    MEDICATIONS  (PRN):  acetaminophen   Tablet .. 650 milliGRAM(s) Oral every 6 hours PRN Mild Pain (1 - 3)    Currently Undergoing Physical Therapy at bedside.    Functional Status Assessment:    Pain Assessment/Number Scale (0-10) Adult  Presence of Pain: complains of pain/discomfort   MD Silvestre made aware  Body Location: Left:   dorsum of foot  Pain Rating (0-10): Rest: 0   Pain Rating (0-10): Activity: 10   Pain Precipitating/Aggravating Factors: weight-bearing activity;  palpation    Cognitive/Neuro      Cognitive/Perceptual/Neuro  Level of Consciousness: alert  Orientation: oriented x 4  Speech: clear  Mood/Behavior: calm;  cooperative    Therapeutic Interventions      Bed Mobility  Bed Mobility Training Symptoms Noted During/After Treatment: none  Bed Mobility Training Scooting: moderate assist (50% patient effort);  2 person assist;  nonverbal cues (demo/gestures);  verbal cues;  scooting to EOB in sitting  Bed Mobility Training Sit-to-Supine: moderate assist (50% patient effort);  2 person assist;  nonverbal cues (demo/gestures);  verbal cues;  HOB < 20 degreess;  poor eccentric trunk control  Bed Mobility Training Supine-to-Sit: moderate assist (50% patient effort);  2 person assist;  nonverbal cues (demo/gestures);  verbal cues;  able to bring B/L LEs to EOB with ~50% effort; increased assist for trunk mobility;  HOB elevated ~30 degrees  Bed Mobility Training Limitations: decreased ability to use arms for pushing/pulling;  decreased ability to use legs for bridging/pushing;  impaired ability to control trunk for mobility;  decreased flexibility;  decreased ROM;  decreased strength;  impaired balance;  impaired coordination;  impaired postural control    Sit-Stand Transfer Training  Sit-to-Stand Transfer Training Symptoms Noted During/After Treatment: increased pain  Transfer Training Sit-to-Stand Transfer: maximum assist (25% patient effort);  2 person assist;  nonverbal cues (demo/gestures);  verbal cues;  3 trials performed; maintained static standing tolerance for ~20-30 seconds each trial howvever unable to maintain complete trunk/bilateral hip extension despite max manual cueing;  rolling walker  Transfer Training Stand-to-Sit Transfer: maximum assist (25% patient effort);  2 person assist;  nonverbal cues (demo/gestures);  verbal cues;  poor eccentric control upon descent;  rolling walker  Sit-to-Stand Transfer Training Transfer Safety Analysis: decreased balance;  decreased sequencing ability;  decreased step length;  decreased weight-shifting ability;  decreased flexibility;  decreased ROM;  decreased strength;  impaired balance;  impaired coordination;  impaired postural control;  pain    Gait Training  Gait Training Treatment not Performed: Unable to perform 2/2 poor static standing tolerance and postural control despite 2 person assist and rolling walker    Therapeutic Exercise  Therapeutic Exercise Detail: Dangled EOB x ~10 minutes with mod assist x 1 to maintain trunk postural control throughout dynamic activity // Seated, bilateral LEs: hip flexion, long arc quads, ankle pumps (all through full range, 1 set, 10 reps). Patient tolerated therex well.             PM&R Impression: as above    Current Disposition Plan Recommendations: subacute rehab placement

## 2019-12-05 NOTE — PROGRESS NOTE ADULT - ASSESSMENT
per Internal Medicine    88 yo F with PMHx of HTN, b/l hip replacements, who presents with weakness and difficulty walking after likely mechanical fall, now being treated for UTI.    Problem/Plan - 1:  ·  Problem: Fall.  Plan: Likely mechanical fall given patient reported stumble prior to fall. Low suspicion for neurogenic (denies hx of seizures, TIA, stroke), cardiogenic (denies chest pain, palpitations, previous syncope events), or orthostatic (denies poor PO intake, denies any lightheadedness/vision changes) causes to fall at this time. HD stable in ED. XR hip and femur negative for fracture. No pain.  - Pt has hx of falls, records indicate pt had previously presented to [facility] ED on 1/2018 and found to have 7 R rib fractures 2/2 fall requiring transfer to trauma center.  - PT evaluation for ambulation status  - Tylenol prn for pain control  - f/u b12, folate, rpr.     Problem/Plan - 2:  ·  Problem: Generalized weakness.  Plan: Likely baseline weakness and deconditioning, more pronounced in b/l lower extremities. Hx of R femor fixation and L hip arthroplasty. Pt non-toxic appearing. Pt without lab abnormalities that could explain weakness. TSH normal. Good strength on exam.  - PT evaluation.     Problem/Plan - 3:  ·  Problem: Acute cystitis without hematuria.  Plan: Pt with reported dysuria and a WBC 13.77 on admission, now wbc back up to 13 while on treatment for uti, unclear if hemoconcentration but unlikely new infection in addition to uti given no complaints from patient and clinical stability.  - cw Macrobid 100mg BID for 5 days (started 12/3).     Problem/Plan - 4:  ·  Problem: HTN (hypertension).  Plan: Per pcp pt was started on lopressor bid for htn and tachy; per pmd pt reports not being able to afford certain medications so was settled on home lopressor bid. Has been tachy and hypertensive here throughout and asymptomatic.  - increase to lopressor 75mg BID; home dose is reportedly 50.     Problem/Plan - 5:  ·  Problem: Nutrition, metabolism, and development symptoms.  Plan: F: none  E: Replete PRN  N: DASH/TLC  DVT ppx: lovenox    FULL CODE    Dispo: [facility]

## 2019-12-05 NOTE — PROGRESS NOTE ADULT - PROBLEM SELECTOR PLAN 3
Pt with reported dysuria and a WBC 13.77 on admission, now wbc back up to 13 while on treatment for uti, unclear if hemoconcentration but unlikely new infection in addition to uti given no complaints from patient and clinical stability.  - cw Macrobid 100mg BID for 5 days (started 12/3)

## 2019-12-05 NOTE — PROGRESS NOTE ADULT - PROBLEM SELECTOR PLAN 6
1) PCP Contacted on Admission: (Y/N) --> Name & Phone #: Dr. Alycia Kaba [phone]  2) Date of Contact with PCP:  3) PCP Contacted at Discharge: (Y/N, N/A)  4) Summary of Handoff Given to PCP:   5) Post-Discharge Appointment Date and Location:

## 2019-12-05 NOTE — PROGRESS NOTE ADULT - PROBLEM SELECTOR PLAN 1
Likely mechanical fall given patient reported stumble prior to fall. Low suspicion for neurogenic (denies hx of seizures, TIA, stroke), cardiogenic (denies chest pain, palpitations, previous syncope events), or orthostatic (denies poor PO intake, denies any lightheadedness/vision changes) causes to fall at this time. HD stable in ED. XR hip and femur negative for fracture. No pain.  - Pt has hx of falls, records indicate pt had previously presented to [facility] ED on 1/2018 and found to have 7 R rib fractures 2/2 fall requiring transfer to trauma center.  - PT evaluation for ambulation status  - Tylenol prn for pain control  - f/u b12, folate, rpr

## 2019-12-05 NOTE — PROGRESS NOTE ADULT - ATTENDING COMMENTS
C/w nitrofurantoin for possible UTI treatment, total of 5 days  Pt is now agreeable to going to [facility] after meeting with patient along with SW and PT  Will increase lopressor to 75mg BID for tachycardia treatment  F/up leukocytosis  Rest as above

## 2019-12-05 NOTE — PROGRESS NOTE ADULT - ASSESSMENT
88 yo F with PMHx of HTN, b/l hip replacements, who presents with weakness and difficulty walking after likely mechanical fall, now being treated for UTI.

## 2019-12-05 NOTE — PROGRESS NOTE ADULT - SUBJECTIVE AND OBJECTIVE BOX
OVERNIGHT EVENTS: Pt with elevated BPs 160s/100s and HRs in 120s, given lopressor AM dose early. Repeat BP and HR was similar, by early PM pt was back to baseline BPs and HR. Pt was asymptomatic throughout    SUBJECTIVE: Pt feeling well. Without complaints or concerns.   Denies fever chilles nausea vomiting. No chest pain or SOB. No dysuria or abdominal pain. No diarrhea.    Vital Signs Last 12 Hrs  T(F): 97.5 (12-05-19 @ 13:19), Max: 97.8 (12-05-19 @ 05:42)  HR: 105 (12-05-19 @ 13:19) (105 - 121)  BP: 124/85 (12-05-19 @ 13:19) (124/85 - 167/121)  BP(mean): --  RR: 18 (12-05-19 @ 13:19) (18 - 20)  SpO2: 93% (12-05-19 @ 13:19) (93% - 100%)  I&O's Summary      PHYSICAL EXAM:  General: Elderly woman, WDWN, NAD, Laying comfortably in bed  HEENT: NC/AT, anicteric sclera, MMM  Neck: trachea midline  Cardiovascular: +S1/S2, tachy, reg rhythm, No murmurs, rubs, gallops  Respiratory: CTA B/L, no W/R/R although exam limited by effort  Gastrointestinal: soft, NT/ND, no suprapubic fullness or tenderness  Extremities: no edema, clubbing or cyanosis, no b/l hip or knee tenderness. ROM grossly intact. Pulses present throughotu  Neurological: AAOx3        LABS:                        14.2   13.49 )-----------( 372      ( 05 Dec 2019 07:39 )             43.3     12-05    135  |  100  |  16  ----------------------------<  127<H>  3.9   |  25  |  0.60    Ca    9.0      05 Dec 2019 07:39  Mg     2.0     12-04    TPro  7.4  /  Alb  3.3  /  TBili  0.6  /  DBili  <0.2  /  AST  26  /  ALT  13  /  AlkPhos  88  12-05          RADIOLOGY & ADDITIONAL TESTS:    MEDICATIONS  (STANDING):  enoxaparin Injectable 40 milliGRAM(s) SubCutaneous every 24 hours  influenza   Vaccine 0.5 milliLiter(s) IntraMuscular once  metoprolol tartrate 75 milliGRAM(s) Oral every 12 hours  nitrofurantoin monohydrate/macrocrystals (MACROBID) 100 milliGRAM(s) Oral two times a day with meals    MEDICATIONS  (PRN):  acetaminophen   Tablet .. 650 milliGRAM(s) Oral every 6 hours PRN Mild Pain (1 - 3)

## 2019-12-06 LAB
ANION GAP SERPL CALC-SCNC: 9 MMOL/L — SIGNIFICANT CHANGE UP (ref 5–17)
BUN SERPL-MCNC: 29 MG/DL — HIGH (ref 7–23)
CALCIUM SERPL-MCNC: 8.8 MG/DL — SIGNIFICANT CHANGE UP (ref 8.4–10.5)
CHLORIDE SERPL-SCNC: 102 MMOL/L — SIGNIFICANT CHANGE UP (ref 96–108)
CO2 SERPL-SCNC: 27 MMOL/L — SIGNIFICANT CHANGE UP (ref 22–31)
CREAT SERPL-MCNC: 0.72 MG/DL — SIGNIFICANT CHANGE UP (ref 0.5–1.3)
GLUCOSE SERPL-MCNC: 118 MG/DL — HIGH (ref 70–99)
HCT VFR BLD CALC: 45.9 % — HIGH (ref 34.5–45)
HGB BLD-MCNC: 14.1 G/DL — SIGNIFICANT CHANGE UP (ref 11.5–15.5)
MAGNESIUM SERPL-MCNC: 2.3 MG/DL — SIGNIFICANT CHANGE UP (ref 1.6–2.6)
MCHC RBC-ENTMCNC: 29.7 PG — SIGNIFICANT CHANGE UP (ref 27–34)
MCHC RBC-ENTMCNC: 30.7 GM/DL — LOW (ref 32–36)
MCV RBC AUTO: 96.8 FL — SIGNIFICANT CHANGE UP (ref 80–100)
NRBC # BLD: 0 /100 WBCS — SIGNIFICANT CHANGE UP (ref 0–0)
PHOSPHATE SERPL-MCNC: 4.1 MG/DL — SIGNIFICANT CHANGE UP (ref 2.5–4.5)
PLATELET # BLD AUTO: 350 K/UL — SIGNIFICANT CHANGE UP (ref 150–400)
POTASSIUM SERPL-MCNC: 4.3 MMOL/L — SIGNIFICANT CHANGE UP (ref 3.5–5.3)
POTASSIUM SERPL-SCNC: 4.3 MMOL/L — SIGNIFICANT CHANGE UP (ref 3.5–5.3)
RBC # BLD: 4.74 M/UL — SIGNIFICANT CHANGE UP (ref 3.8–5.2)
RBC # FLD: 14.5 % — SIGNIFICANT CHANGE UP (ref 10.3–14.5)
SODIUM SERPL-SCNC: 138 MMOL/L — SIGNIFICANT CHANGE UP (ref 135–145)
WBC # BLD: 11.31 K/UL — HIGH (ref 3.8–10.5)
WBC # FLD AUTO: 11.31 K/UL — HIGH (ref 3.8–10.5)

## 2019-12-06 PROCEDURE — 99233 SBSQ HOSP IP/OBS HIGH 50: CPT | Mod: GC

## 2019-12-06 RX ADMIN — ENOXAPARIN SODIUM 40 MILLIGRAM(S): 100 INJECTION SUBCUTANEOUS at 12:28

## 2019-12-06 RX ADMIN — Medication 1 TABLET(S): at 17:36

## 2019-12-06 RX ADMIN — Medication 100 MILLIGRAM(S): at 17:37

## 2019-12-06 RX ADMIN — Medication 75 MILLIGRAM(S): at 17:36

## 2019-12-06 RX ADMIN — Medication 75 MILLIGRAM(S): at 05:59

## 2019-12-06 RX ADMIN — Medication 100 MILLIGRAM(S): at 12:28

## 2019-12-06 NOTE — DIETITIAN INITIAL EVALUATION ADULT. - PROBLEM SELECTOR PLAN 3
Pt presenting with WBC 13.77. Most likely etiologies include reactive 2/2 fall vs UTI given dysuria. Pt non-toxic appearing, afebrile.  - f/u UA  - trend CBC

## 2019-12-06 NOTE — PROGRESS NOTE ADULT - SUBJECTIVE AND OBJECTIVE BOX
OVERNIGHT EVENTS: NAEO    SUBJECTIVE: Pt seen and examined at bedside, without concerns or complaints  Denies fcnv, abd pain chest pain sob. No diarrhea or dysuria.    Vital Signs Last 12 Hrs  T(F): 98.4 (12-06-19 @ 05:25), Max: 98.4 (12-06-19 @ 05:25)  HR: 96 (12-06-19 @ 05:25) (96 - 96)  BP: 143/94 (12-06-19 @ 05:25) (143/94 - 143/94)  BP(mean): --  RR: 19 (12-06-19 @ 05:25) (19 - 19)  SpO2: 93% (12-06-19 @ 05:25) (93% - 93%)  I&O's Summary      PHYSICAL EXAM:  General: Elderly woman, WDWN, NAD, Laying comfortably in bed  HEENT: NC/AT, anicteric sclera, MMM  Neck: trachea midline  Cardiovascular: +S1/S2, tachy, reg rhythm, No murmurs, rubs, gallops  Respiratory: CTA B/L, no W/R/R although exam limited by effort  Gastrointestinal: soft, NT/ND, no suprapubic fullness or tenderness  Extremities: no edema, clubbing or cyanosis, no b/l hip or knee tenderness. ROM grossly intact. Pulses present throughotu  Neurological: AAOx3        LABS:                        14.1   11.31 )-----------( 350      ( 06 Dec 2019 07:01 )             45.9     12-06    138  |  102  |  29<H>  ----------------------------<  118<H>  4.3   |  27  |  0.72    Ca    8.8      06 Dec 2019 07:01  Phos  4.1     12-06  Mg     2.3     12-06    TPro  7.4  /  Alb  3.3  /  TBili  0.6  /  DBili  <0.2  /  AST  26  /  ALT  13  /  AlkPhos  88  12-05          RADIOLOGY & ADDITIONAL TESTS:    MEDICATIONS  (STANDING):  enoxaparin Injectable 40 milliGRAM(s) SubCutaneous every 24 hours  influenza   Vaccine 0.5 milliLiter(s) IntraMuscular once  metoprolol tartrate 75 milliGRAM(s) Oral every 12 hours  multivitamin 1 Tablet(s) Oral daily  nitrofurantoin monohydrate/macrocrystals (MACROBID) 100 milliGRAM(s) Oral two times a day with meals    MEDICATIONS  (PRN):  acetaminophen   Tablet .. 650 milliGRAM(s) Oral every 6 hours PRN Mild Pain (1 - 3)

## 2019-12-06 NOTE — PROGRESS NOTE ADULT - PROBLEM SELECTOR PLAN 4
Per pcp pt was started on lopressor bid for htn and tachy; per pmd pt reports not being able to afford certain medications so was settled on home lopressor bid. Has been tachy and hypertensive here throughout and asymptomatic.  - increase to lopressor 75mg BID; home dose is reportedly 50. HR and diastolic bps now responding to below 100

## 2019-12-06 NOTE — DIETITIAN INITIAL EVALUATION ADULT. - PROBLEM SELECTOR PLAN 2
Likely baseline weakness. Pt non-toxic appearing. Pt without lab abnormalities that could explain weakness  - PT eval

## 2019-12-06 NOTE — PROGRESS NOTE ADULT - ASSESSMENT
89 yo F with PMHx of HTN, b/l hip replacements, who presents with weakness and difficulty walking after likely mechanical fall, now being treated for UTI, last day abx 12/7; pending [facility].

## 2019-12-06 NOTE — PROGRESS NOTE ADULT - PROBLEM SELECTOR PLAN 6
1) PCP Contacted on Admission: (Y/N) --> Name & Phone #: Dr. Alycia Kaba [phone]  2) Date of Contact with PCP:  3) PCP Contacted at Discharge: (Y/N, N/A)  4) Summary of Handoff Given to PCP:   5) Post-Discharge Appointment Date and Location:

## 2019-12-06 NOTE — PROGRESS NOTE ADULT - ATTENDING COMMENTS
C/w macrobid for UTI, last day tomorrow  Awaiting insurance authorization for [facility]  Rest as above C/w macrobid for UTI, last day tomorrow  Tachycardia seems to be better on current dose of lopressor, to be continued  Awaiting insurance authorization for [facility]  Rest as above

## 2019-12-06 NOTE — DIETITIAN INITIAL EVALUATION ADULT. - ENERGY NEEDS
Height: 4 feet 11 inches, Weight (Current): 150 pounds, 98 IBW pounds +/-10%, %%, BMI 30.3  upper-IBW used to calculate energy needs due to pt's current body weight exceeding 120% of IBW; suggest higher end of calorie needs

## 2019-12-06 NOTE — DIETITIAN INITIAL EVALUATION ADULT. - OTHER INFO
89 y/o F with a history of HTN and b/l hip and shoulder surgery who presents after falling in her home on the day of admission, now being treated for UTI and Acute cystitis without hematuria. Possible d/c to [facility]. No pain per flow sheets. No Edema. +BM 12/3. No pressure ulcers.   DASH Diet (12/2) 89 y/o F with a history of HTN and b/l hip and shoulder surgery who presents after falling in her home on the day of admission, now being treated for UTI and Acute cystitis without hematuria. Possible d/c to [facility]. No pain per flow sheets. No Edema. +BM 12/3; denies N/V/C/D. No pressure ulcers.   Regular diet PTA, good PO intake, No issues chewing/swallowing, NKFA. Pt does not take oral nutrition supplements, Denies taking MVI. DASH Diet (12/2) - reports still eating well and consuming much of plate. Lunch desean seen at bedside, appears pt only ordered sandwich however was 100% consumed, dessert picked at. Pt had hard time recalling UBW, reports possibly 130 pounds and being wt stable (suggests BMI 26.25, BMI seemed slightly higher). 150 pounds (12/2) current body wt noted which seems more accurate.   Pt denied having questions during RD visit.  Please see below for nutritions recommendations. Recs made with team.

## 2019-12-06 NOTE — PROGRESS NOTE ADULT - PROBLEM SELECTOR PLAN 1
Likely mechanical fall given patient reported stumble prior to fall. XR hip and femur negative for fracture. No pain.  - Pt has hx of falls, records indicate pt had previously presented to [facility] ED on 1/2018 and found to have 7 R rib fractures 2/2 fall requiring transfer to trauma center.  - PT says [facility]  - Tylenol prn for pain control

## 2019-12-06 NOTE — DIETITIAN INITIAL EVALUATION ADULT. - ADD RECOMMEND
1. Monitor PO intake/appetite - Consider ONS if PO intake falls below 50% of meals, GI distress, diet tolerance, labs, weights.  2. MVI daily.   3. RD to remain available for additional nutrition interventions as needed.

## 2019-12-06 NOTE — DIETITIAN INITIAL EVALUATION ADULT. - PROBLEM SELECTOR PLAN 1
Likely mechanical fall given patient reported stumble prior to fall. Other possible causes include neurogenic (denies hx of seizures, TIA, stroke), cardiogenic (denies chest pain, palpitations, previous syncope events), or orthostatic (denies poor PO intake, denies any lightheadedness/vision changes). HD stable in ED. XR hip and femur negative for fracture.  - PT evaluation for ambulation status  - Tylenol prn for pain control

## 2019-12-06 NOTE — PROGRESS NOTE ADULT - PROBLEM SELECTOR PLAN 2
Likely baseline weakness and deconditioning, more pronounced in b/l lower extremities. Hx of R femor fixation and L hip arthroplasty. Pt non-toxic appearing. TSH normal. Good strength on exam.  -pending [facility]  -dietician eval, multivitamin daily and follow recs

## 2019-12-07 ENCOUNTER — TRANSCRIPTION ENCOUNTER (OUTPATIENT)
Age: 84
End: 2019-12-07

## 2019-12-07 VITALS
DIASTOLIC BLOOD PRESSURE: 92 MMHG | RESPIRATION RATE: 17 BRPM | HEART RATE: 99 BPM | TEMPERATURE: 99 F | OXYGEN SATURATION: 97 % | SYSTOLIC BLOOD PRESSURE: 146 MMHG

## 2019-12-07 LAB
ANION GAP SERPL CALC-SCNC: 10 MMOL/L — SIGNIFICANT CHANGE UP (ref 5–17)
BUN SERPL-MCNC: 26 MG/DL — HIGH (ref 7–23)
CALCIUM SERPL-MCNC: 9 MG/DL — SIGNIFICANT CHANGE UP (ref 8.4–10.5)
CHLORIDE SERPL-SCNC: 102 MMOL/L — SIGNIFICANT CHANGE UP (ref 96–108)
CO2 SERPL-SCNC: 26 MMOL/L — SIGNIFICANT CHANGE UP (ref 22–31)
CREAT SERPL-MCNC: 0.64 MG/DL — SIGNIFICANT CHANGE UP (ref 0.5–1.3)
GLUCOSE SERPL-MCNC: 111 MG/DL — HIGH (ref 70–99)
HCT VFR BLD CALC: 44.6 % — SIGNIFICANT CHANGE UP (ref 34.5–45)
HGB BLD-MCNC: 13.6 G/DL — SIGNIFICANT CHANGE UP (ref 11.5–15.5)
MAGNESIUM SERPL-MCNC: 2.2 MG/DL — SIGNIFICANT CHANGE UP (ref 1.6–2.6)
MCHC RBC-ENTMCNC: 29.9 PG — SIGNIFICANT CHANGE UP (ref 27–34)
MCHC RBC-ENTMCNC: 30.5 GM/DL — LOW (ref 32–36)
MCV RBC AUTO: 98 FL — SIGNIFICANT CHANGE UP (ref 80–100)
NRBC # BLD: 0 /100 WBCS — SIGNIFICANT CHANGE UP (ref 0–0)
PHOSPHATE SERPL-MCNC: 3.9 MG/DL — SIGNIFICANT CHANGE UP (ref 2.5–4.5)
PLATELET # BLD AUTO: 341 K/UL — SIGNIFICANT CHANGE UP (ref 150–400)
POTASSIUM SERPL-MCNC: 4.2 MMOL/L — SIGNIFICANT CHANGE UP (ref 3.5–5.3)
POTASSIUM SERPL-SCNC: 4.2 MMOL/L — SIGNIFICANT CHANGE UP (ref 3.5–5.3)
RBC # BLD: 4.55 M/UL — SIGNIFICANT CHANGE UP (ref 3.8–5.2)
RBC # FLD: 14.4 % — SIGNIFICANT CHANGE UP (ref 10.3–14.5)
SODIUM SERPL-SCNC: 138 MMOL/L — SIGNIFICANT CHANGE UP (ref 135–145)
WBC # BLD: 11.62 K/UL — HIGH (ref 3.8–10.5)
WBC # FLD AUTO: 11.62 K/UL — HIGH (ref 3.8–10.5)

## 2019-12-07 PROCEDURE — 99285 EMERGENCY DEPT VISIT HI MDM: CPT

## 2019-12-07 PROCEDURE — 97530 THERAPEUTIC ACTIVITIES: CPT

## 2019-12-07 PROCEDURE — 36415 COLL VENOUS BLD VENIPUNCTURE: CPT

## 2019-12-07 PROCEDURE — 85025 COMPLETE CBC W/AUTO DIFF WBC: CPT

## 2019-12-07 PROCEDURE — 73552 X-RAY EXAM OF FEMUR 2/>: CPT

## 2019-12-07 PROCEDURE — 85027 COMPLETE CBC AUTOMATED: CPT

## 2019-12-07 PROCEDURE — 93005 ELECTROCARDIOGRAM TRACING: CPT

## 2019-12-07 PROCEDURE — 80076 HEPATIC FUNCTION PANEL: CPT

## 2019-12-07 PROCEDURE — 80048 BASIC METABOLIC PNL TOTAL CA: CPT

## 2019-12-07 PROCEDURE — 83735 ASSAY OF MAGNESIUM: CPT

## 2019-12-07 PROCEDURE — 81001 URINALYSIS AUTO W/SCOPE: CPT

## 2019-12-07 PROCEDURE — 84100 ASSAY OF PHOSPHORUS: CPT

## 2019-12-07 PROCEDURE — 87086 URINE CULTURE/COLONY COUNT: CPT

## 2019-12-07 PROCEDURE — 97110 THERAPEUTIC EXERCISES: CPT

## 2019-12-07 PROCEDURE — 73502 X-RAY EXAM HIP UNI 2-3 VIEWS: CPT

## 2019-12-07 PROCEDURE — 97161 PT EVAL LOW COMPLEX 20 MIN: CPT

## 2019-12-07 PROCEDURE — 84443 ASSAY THYROID STIM HORMONE: CPT

## 2019-12-07 PROCEDURE — 99239 HOSP IP/OBS DSCHRG MGMT >30: CPT | Mod: GC

## 2019-12-07 RX ORDER — METOPROLOL TARTRATE 50 MG
1 TABLET ORAL
Qty: 0 | Refills: 0 | DISCHARGE
Start: 2019-12-07

## 2019-12-07 RX ADMIN — ENOXAPARIN SODIUM 40 MILLIGRAM(S): 100 INJECTION SUBCUTANEOUS at 11:20

## 2019-12-07 RX ADMIN — Medication 100 MILLIGRAM(S): at 09:56

## 2019-12-07 RX ADMIN — Medication 1 TABLET(S): at 11:21

## 2019-12-07 RX ADMIN — Medication 75 MILLIGRAM(S): at 05:42

## 2019-12-07 RX ADMIN — Medication 100 MILLIGRAM(S): at 15:04

## 2019-12-07 NOTE — DISCHARGE NOTE PROVIDER - HOSPITAL COURSE
88 yo F with PMHx of HTN, b/l hip replacements, who presents with weakness and difficulty walking after likely mechanical fall, now being treated for UTI, last day abx 12/7; pending [facility].        Inpatient Course and Management:        # Fall.      - Likely mechanical fall given patient reported stumble prior to fall. XR hip and femur negative for fracture. No pain.    - Pt has hx of falls, records indicate pt had previously presented to [facility] ED on 1/2018 and found to have 7 R rib fractures 2/2 fall requiring transfer to trauma center.    - PT recommending [facility]    - Tylenol prn for pain control.         # Generalized weakness.      -Likely baseline weakness and deconditioning, more pronounced in b/l lower extremities. Hx of R femor fixation and L hip arthroplasty. Pt non-toxic appearing. TSH normal. Good strength on exam.    -pending [facility]    -dietician eval, multivitamin daily and follow recs.         # Acute cystitis without hematuria.      - Pt with reported dysuria and a WBC 13.77 on admission, now wbc back up to 13 while on treatment for uti, unclear if hemoconcentration but unlikely new infection in addition to uti given no complaints from patient and clinical stability.    - c/w Macrobid 100mg BID for 5 days (started 12/3-12/7), completed course.         # HTN (hypertension).      -Per pcp pt was started on lopressor bid for htn and tachy; per pmd pt reports not being able to afford certain medications so was settled on home lopressor bid. Has been tachy and hypertensive here throughout and asymptomatic.    - increased lopressor to 75mg BID         New Medications: Metoprolol tartrate 75mg twice a day    Exams to follow: None    Labs to follow: None

## 2019-12-07 NOTE — PROGRESS NOTE ADULT - REASON FOR ADMISSION
Mechanical Fall

## 2019-12-07 NOTE — DISCHARGE NOTE PROVIDER - CARE PROVIDER_API CALL
Alycia Kaba)  Internal Medicine  [address], [address]  [place] [address]  Phone: [phone]  Fax: [phone]  Established Patient  Follow Up Time: 2 weeks

## 2019-12-07 NOTE — PROGRESS NOTE ADULT - PROBLEM SELECTOR PLAN 3
Pt with reported dysuria and a WBC 13.77 on admission, now wbc back up to 13 while on treatment for uti, unclear if hemoconcentration but unlikely new infection in addition to uti given no complaints from patient and clinical stability.  - c/w Macrobid 100mg BID for 5 days (started 12/3-12/7), completed course

## 2019-12-07 NOTE — PROGRESS NOTE ADULT - SUBJECTIVE AND OBJECTIVE BOX
INTERVAL HPI/OVERNIGHT EVENTS: No acute events overnight.    SUBJECTIVE: Patient seen and examined at bedside. Slept well overnight. Good appetite. No acute complaints. Denies fever, headache, nausea, vomiting, chest pain, shortness of breath, abdominal pain, changes in bowel movements or urination including frequency, dysuria, or fullness.     OBJECTIVE:    VITAL SIGNS:  ICU Vital Signs Last 24 Hrs  T(C): 37 (07 Dec 2019 05:03), Max: 37 (07 Dec 2019 05:03)  T(F): 98.6 (07 Dec 2019 05:03), Max: 98.6 (07 Dec 2019 05:03)  HR: 99 (07 Dec 2019 05:03) (93 - 104)  BP: 146/92 (07 Dec 2019 05:03) (130/86 - 146/92)  BP(mean): --  ABP: --  ABP(mean): --  RR: 17 (07 Dec 2019 05:03) (16 - 18)  SpO2: 97% (07 Dec 2019 05:03) (93% - 97%)        12-06 @ 07:01  -  12-07 @ 07:00  --------------------------------------------------------  IN: 0 mL / OUT: 600 mL / NET: -600 mL      CAPILLARY BLOOD GLUCOSE          PHYSICAL EXAM:  General: Elderly woman, WDWN, NAD, Laying comfortably in bed, answering questions  HEENT: NC/AT, anicteric sclera, MMM  Neck: trachea midline, no JVD  Cardiovascular: +S1/S2, tachycardic rate and regular rhythm, No murmurs, rubs, gallops  Respiratory: CTA B/L, no W/R/R  Gastrointestinal: soft, NT/ND, no suprapubic fullness or tenderness  Extremities: no edema, clubbing or cyanosis, no b/l hip or knee tenderness.  Neurological: AAOx3      MEDICATIONS:  MEDICATIONS  (STANDING):  enoxaparin Injectable 40 milliGRAM(s) SubCutaneous every 24 hours  influenza   Vaccine 0.5 milliLiter(s) IntraMuscular once  metoprolol tartrate 75 milliGRAM(s) Oral every 12 hours  multivitamin 1 Tablet(s) Oral daily  nitrofurantoin monohydrate/macrocrystals (MACROBID) 100 milliGRAM(s) Oral two times a day with meals    MEDICATIONS  (PRN):  acetaminophen   Tablet .. 650 milliGRAM(s) Oral every 6 hours PRN Mild Pain (1 - 3)      ALLERGIES:  Allergies    No Known Allergies    Intolerances        LABS:                        13.6   11.62 )-----------( 341      ( 07 Dec 2019 07:59 )             44.6     12-07    138  |  102  |  26<H>  ----------------------------<  111<H>  4.2   |  26  |  0.64    Ca    9.0      07 Dec 2019 07:59  Phos  3.9     12-07  Mg     2.2     12-07            RADIOLOGY & ADDITIONAL TESTS: Reviewed.

## 2019-12-07 NOTE — DISCHARGE NOTE PROVIDER - NSDCCPCAREPLAN_GEN_ALL_CORE_FT
PRINCIPAL DISCHARGE DIAGNOSIS  Diagnosis: Fall at home  Assessment and Plan of Treatment: You came in after you fell down at home and you have a history of falls. An xray of your hip and leg were negative for a fracture or broken leg. Physical therapy worked with you to help get you stronger. They are recommending rehab to keep building strength each day. You should work on slow purposeful movements to ensure that you don't fall again and be sure to stay hydrated. You can take tylenol over the counter as directed for any pain or discomfort. You should follow with your primary care provider, Dr. Kaba, after discharge to discuss further management and medication changes.      SECONDARY DISCHARGE DIAGNOSES  Diagnosis: Hypertension  Assessment and Plan of Treatment: You have a history of high blood pressure which you take medication for at home. We icnreased your dose per your primary care physician to metoprolol tartrate 75mg every 12 hours (AM and PM). You should continue to take this as prescribed. You should follow with your primary care provider, Dr. Kaba, after discharge to discuss further management and medication changes.    Diagnosis: Urinary tract infection  Assessment and Plan of Treatment: When you came into the hospital, it was found that you had a urinary tract infection. We treated this infection with 5 days of antibiotics called macrobid. You completed the course and you do not have any symptoms. You should follow with your primary care provider, Dr. Kaba, after discharge to discuss further management and medication changes.

## 2019-12-07 NOTE — PROGRESS NOTE ADULT - PROBLEM SELECTOR PLAN 6
1) PCP Contacted on Admission: (Y/N) --> Name & Phone #: Dr. Alycia Kaba [phone]  2) Date of Contact with PCP:  3) PCP Contacted at Discharge: (Y/N, N/A)  4) Summary of Handoff Given to PCP:   5) Post-Discharge Appointment Date and Location:

## 2019-12-07 NOTE — PROGRESS NOTE ADULT - PROBLEM SELECTOR PLAN 1
Likely mechanical fall given patient reported stumble prior to fall. XR hip and femur negative for fracture. No pain.  - Pt has hx of falls, records indicate pt had previously presented to [facility] ED on 1/2018 and found to have 7 R rib fractures 2/2 fall requiring transfer to trauma center.  - PT recommending CHARLIE  - Tylenol prn for pain control

## 2019-12-07 NOTE — DISCHARGE NOTE NURSING/CASE MANAGEMENT/SOCIAL WORK - PATIENT PORTAL LINK FT
You can access the FollowMyHealth Patient Portal offered by [facility] by registering at the following website: [email or web address]. By joining [facility]’s FollowMyHealth portal, you will also be able to view your health information using other applications (apps) compatible with our system.

## 2019-12-07 NOTE — DISCHARGE NOTE PROVIDER - NSDCMRMEDTOKEN_GEN_ALL_CORE_FT
calcium-vitamin D:  orally   metoprolol tartrate 75 mg oral tablet: 1 tab(s) orally every 12 hours  Multiple Vitamins oral tablet: 1 tab(s) orally once a day

## 2019-12-11 DIAGNOSIS — I10 ESSENTIAL (PRIMARY) HYPERTENSION: ICD-10-CM
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
## 2019-12-11 DIAGNOSIS — R53.1 WEAKNESS: ICD-10-CM

## 2019-12-11 DIAGNOSIS — D72.829 ELEVATED WHITE BLOOD CELL COUNT, UNSPECIFIED: ICD-10-CM

## 2019-12-11 DIAGNOSIS — S79.912A UNSPECIFIED INJURY OF LEFT HIP, INITIAL ENCOUNTER: ICD-10-CM

## 2019-12-11 DIAGNOSIS — Z87.81 PERSONAL HISTORY OF (HEALED) TRAUMATIC FRACTURE: ICD-10-CM

## 2019-12-11 DIAGNOSIS — R30.0 DYSURIA: ICD-10-CM
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
## 2019-12-11 DIAGNOSIS — Z96.643 PRESENCE OF ARTIFICIAL HIP JOINT, BILATERAL: ICD-10-CM

## 2019-12-11 DIAGNOSIS — N30.00 ACUTE CYSTITIS WITHOUT HEMATURIA: ICD-10-CM
